# Patient Record
Sex: FEMALE | Race: WHITE | NOT HISPANIC OR LATINO | Employment: FULL TIME | URBAN - METROPOLITAN AREA
[De-identification: names, ages, dates, MRNs, and addresses within clinical notes are randomized per-mention and may not be internally consistent; named-entity substitution may affect disease eponyms.]

---

## 2023-11-17 ENCOUNTER — OFFICE VISIT (OUTPATIENT)
Dept: ENDOCRINOLOGY | Facility: CLINIC | Age: 33
End: 2023-11-17
Payer: COMMERCIAL

## 2023-11-17 VITALS
OXYGEN SATURATION: 97 % | HEART RATE: 93 BPM | DIASTOLIC BLOOD PRESSURE: 80 MMHG | BODY MASS INDEX: 41.21 KG/M2 | SYSTOLIC BLOOD PRESSURE: 120 MMHG | HEIGHT: 63 IN | WEIGHT: 232.6 LBS

## 2023-11-17 DIAGNOSIS — E05.90 HYPERTHYROIDISM: Primary | ICD-10-CM

## 2023-11-17 DIAGNOSIS — E04.2 MULTIPLE THYROID NODULES: ICD-10-CM

## 2023-11-17 LAB
T3FREE SERPL-MCNC: 4.87 PG/ML (ref 2–4.4)
T4 FREE SERPL-MCNC: 1.19 NG/DL (ref 0.93–1.7)
TSH SERPL DL<=0.05 MIU/L-ACNC: 0.01 UIU/ML (ref 0.27–4.2)

## 2023-11-17 PROCEDURE — 84445 ASSAY OF TSI GLOBULIN: CPT | Performed by: INTERNAL MEDICINE

## 2023-11-17 PROCEDURE — 84439 ASSAY OF FREE THYROXINE: CPT | Performed by: INTERNAL MEDICINE

## 2023-11-17 PROCEDURE — 86376 MICROSOMAL ANTIBODY EACH: CPT | Performed by: INTERNAL MEDICINE

## 2023-11-17 PROCEDURE — 84443 ASSAY THYROID STIM HORMONE: CPT | Performed by: INTERNAL MEDICINE

## 2023-11-17 PROCEDURE — 84481 FREE ASSAY (FT-3): CPT | Performed by: INTERNAL MEDICINE

## 2023-11-17 PROCEDURE — 83520 IMMUNOASSAY QUANT NOS NONAB: CPT | Performed by: INTERNAL MEDICINE

## 2023-11-17 RX ORDER — RIMEGEPANT SULFATE 75 MG/75MG
75 TABLET, ORALLY DISINTEGRATING ORAL AS NEEDED
COMMUNITY

## 2023-11-17 RX ORDER — PROPRANOLOL HCL 60 MG
60 CAPSULE, EXTENDED RELEASE 24HR ORAL DAILY
COMMUNITY

## 2023-11-17 RX ORDER — TIZANIDINE 4 MG/1
4 TABLET ORAL AS NEEDED
COMMUNITY

## 2023-11-17 RX ORDER — LEVOCETIRIZINE DIHYDROCHLORIDE 5 MG/1
5 TABLET, FILM COATED ORAL EVERY EVENING
COMMUNITY

## 2023-11-17 RX ORDER — TIMOLOL MALEATE 5 MG/ML
1 SOLUTION/ DROPS OPHTHALMIC DAILY
COMMUNITY
Start: 2023-10-12

## 2023-11-17 RX ORDER — ONDANSETRON 4 MG/1
4 TABLET, ORALLY DISINTEGRATING ORAL AS NEEDED
COMMUNITY

## 2023-11-17 RX ORDER — GALCANEZUMAB 120 MG/ML
120 INJECTION, SOLUTION SUBCUTANEOUS
COMMUNITY

## 2023-11-17 RX ORDER — MONTELUKAST SODIUM 10 MG/1
10 TABLET ORAL NIGHTLY
COMMUNITY

## 2023-11-17 NOTE — PROGRESS NOTES
Chief Complaint   Patient presents with    Hyperthyroidism        New patient who is being seen in consultation regarding hyperthyroidism at the request of No ref. provider found     HPI   Fatuma Hurtado is a 33 y.o. female who presents for evaluation of hyperthyroidism.    Patient reports she was initially noted to have an abnormal TSH several years ago.  She is unsure if this was high or low but reports this resolved without intervention and has been normal on annual testing for several years.  She had an abnormal TSH again noted approximately 1 year ago which prompted thyroid ultrasound to be obtained.  Ultrasound noted thyroid nodules which were both biopsied and benign at [facility].  She had repeat ultrasound this year in follow-up.  Patient reports that she presented to her PCP due to issues with ongoing fevers and thyroid function was again checked prompting referral.  She reports that she has been treated for sinus infection as well as manage symptomatically for viral illness over the past few weeks.  She reports this is improved but not resolved.  She is scheduled to have a CT of her sinuses next week to look for ongoing infection.  She denies significant weight changes, changes in bowel habits.  She does endorse fatigue.      Past Medical History:   Diagnosis Date    Hearing loss     Hearing aids in both ears    Migraine      Past Surgical History:   Procedure Laterality Date    CYST REMOVAL Left 2004    Was removed twice    EAR TUBES  2005    EAR TUBES  2001    SINUPLASTY  2018    TONSILLECTOMY  2005    WISDOM TOOTH EXTRACTION  2009      Family History   Problem Relation Age of Onset    Crohn's disease Mother     Hypertension Father     Other Paternal Aunt         Thyroid problem    Stomach cancer Maternal Grandmother       Social History     Socioeconomic History    Marital status: Single   Tobacco Use    Smoking status: Never    Smokeless tobacco: Never   Vaping Use    Vaping Use: Never  "used   Substance and Sexual Activity    Alcohol use: Never    Drug use: Never      Allergies   Allergen Reactions    Azithromycin Rash and Unknown - Low Severity      Current Outpatient Medications on File Prior to Visit   Medication Sig Dispense Refill    Emgality 120 MG/ML auto-injector pen Inject 1 mL under the skin into the appropriate area as directed Every 30 (Thirty) Days.      levocetirizine (XYZAL) 5 MG tablet Take 1 tablet by mouth Every Evening.      montelukast (SINGULAIR) 10 MG tablet Take 1 tablet by mouth Every Night.      ondansetron ODT (ZOFRAN-ODT) 4 MG disintegrating tablet Place 1 tablet on the tongue As Needed.      propranolol LA (INDERAL LA) 60 MG 24 hr capsule Take 1 capsule by mouth Daily.      Rimegepant Sulfate (Nurtec) 75 MG tablet dispersible tablet Take 1 tablet by mouth As Needed.      tiZANidine (ZANAFLEX) 4 MG tablet Take 1 tablet by mouth As Needed.      Vienva 0.1-20 MG-MCG per tablet Take 1 tablet by mouth Daily.       No current facility-administered medications on file prior to visit.        Review of Systems   Constitutional:  Positive for fatigue and fever. Negative for unexpected weight gain and unexpected weight loss.   Cardiovascular:  Negative for palpitations.   Gastrointestinal:  Negative for constipation and diarrhea.   Endocrine: Negative for cold intolerance and heat intolerance.   Neurological:  Negative for tremors.        Vitals:    11/17/23 1420   BP: 120/80   BP Location: Left arm   Patient Position: Sitting   Cuff Size: Adult   Pulse: 93   SpO2: 97%   Weight: 106 kg (232 lb 9.6 oz)   Height: 160 cm (63\")   Body mass index is 41.2 kg/m².     Physical Exam  Vitals reviewed.   Eyes:      General: Lids are normal.   Neck:      Thyroid: Thyromegaly present.   Cardiovascular:      Rate and Rhythm: Normal rate and regular rhythm.   Pulmonary:      Effort: Pulmonary effort is normal.      Breath sounds: Normal breath sounds.   Skin:     General: Skin is warm and dry. "   Neurological:      Mental Status: She is alert.   Psychiatric:         Mood and Affect: Mood and affect normal.        Labs/Imaging  Labs dated 10/30/2023  TSH less than 0.015  Free T4 1.22     Labs dated 10/13/2023  TSH less than 0.015  CMP with alkaline phosphatase 55, AST 20, ALT 29  CBC with ANC 4.05    Thyroid ultrasound dated 11/10/2023  Right thyroid lobe measures 4.0 x 1.3 x 1.6 cm  Left thyroid lobe measures 4 x 2.2 x 2.6 cm  Nodule in left upper thyroid lobe measuring 2.8 x 1.8 x 2.2 cm, this is mixed cystic and solid, increased in size but similar in appearance to prior imaging previously measuring 2.6 x 1.4 x 1.9 cm    Left mid posterior nodule measuring 2.1 x 1.0 x 1.8 cm, similar in size and appearance to prior imaging, TR 3    Assessment and Plan    Diagnoses and all orders for this visit:    1. Hyperthyroidism (Primary)  -     TSH; Future  -     T4, Free; Future  -     T3, Free; Future  -     Thyrotropin Receptor Antibody; Future  -     Thyroid Stimulating Immunoglobulin; Future  -     Thyroid Peroxidase Antibody; Future  Patient with history of abnormal TSH intermittently dating back multiple years.  She has never taken medication to alter thyroid hormone.  Most recent labs with suppressed TSH, normal free T4.  No marker of T3 available.  She does report that she has a few family members with thyroid dysfunction but does not know the exact abnormality.    Potential etiology of the patient's laboratory abnormalities were reviewed.  Given duration, this is less likely to represent thyroiditis though patient did present with acute illness when labs were checked.  Reviewed possibility for overproduction of thyroid hormone secondary to thyroid nodules as well as autoimmune disease.  Plan to update thyroid function testing today and obtain thyroid antibodies.  If patient remains hypothyroid and thyroid antibodies are normal, would consider thyroid uptake and scan.  Reviewed with patient that I would be  hesitant to attribute her ongoing symptoms of fatigue and fevers to thyroid dysfunction given subclinical disease.  I would recommend ongoing evaluation with her PCP for other potential etiologies.  We did discuss potential treatment with methimazole if hyperthyroidism is ongoing.   Risks associated with thyroid hormone dysregulation as well as the risks associated with methimazole use during pregnancy were discussed with the patient.  Patient was counseled to use reliable contraception.  If she were to become pregnant, patient instructed to contact the clinic for further instructions.     2. Multiple thyroid nodules  Patient reports that thyroid nodules were biopsied and benign in 2022.  Requesting records.       Return in about 4 months (around 3/17/2024) for Next scheduled follow up. The patient was instructed to contact the clinic with any interval questions or concerns.    Paige Berkowitz MD     Dictated Utilizing Dragon Dictation

## 2023-11-19 LAB — THYROPEROXIDASE AB SERPL-ACNC: 64 IU/ML (ref 0–34)

## 2023-11-20 ENCOUNTER — TELEPHONE (OUTPATIENT)
Dept: ENDOCRINOLOGY | Facility: CLINIC | Age: 33
End: 2023-11-20
Payer: COMMERCIAL

## 2023-11-20 NOTE — TELEPHONE ENCOUNTER
----- Message from Paige Berkowitz MD sent at 11/17/2023  3:45 PM EST -----  Please request results of prior thyroid FNA from [facility] completed approximately 1 year ago.

## 2023-11-21 LAB
TSH RECEP AB SER-ACNC: <1.1 IU/L (ref 0–1.75)
TSI SER-ACNC: <0.1 IU/L (ref 0–0.55)

## 2024-01-15 ENCOUNTER — HOSPITAL ENCOUNTER (OUTPATIENT)
Dept: NUCLEAR MEDICINE | Facility: HOSPITAL | Age: 34
Discharge: HOME | End: 2024-01-15
Payer: COMMERCIAL

## 2024-01-15 DIAGNOSIS — E05.90 HYPERTHYROIDISM: ICD-10-CM

## 2024-01-15 DIAGNOSIS — E04.2 MULTIPLE THYROID NODULES: ICD-10-CM

## 2024-01-15 PROCEDURE — 78014 THYROID IMAGING W/BLOOD FLOW: CPT

## 2024-01-15 PROCEDURE — A9516 IODINE I-123 SOD IODIDE MIC: HCPCS | Performed by: INTERNAL MEDICINE

## 2024-01-15 PROCEDURE — 0 SODIUM IODIDE 3.7 MBQ CAPSULE: Performed by: INTERNAL MEDICINE

## 2024-01-15 RX ORDER — SODIUM IODIDE I 123 100 UCI/1
1 CAPSULE, GELATIN COATED ORAL
Status: COMPLETED | OUTPATIENT
Start: 2024-01-15 | End: 2024-01-15

## 2024-01-15 RX ADMIN — SODIUM IODIDE I 123 1 CAPSULE: 100 CAPSULE, GELATIN COATED ORAL at 10:59

## 2024-01-16 ENCOUNTER — HOSPITAL ENCOUNTER (OUTPATIENT)
Dept: NUCLEAR MEDICINE | Facility: HOSPITAL | Age: 34
Discharge: HOME | End: 2024-01-16
Payer: COMMERCIAL

## 2024-01-23 ENCOUNTER — TELEPHONE (OUTPATIENT)
Dept: ENDOCRINOLOGY | Facility: CLINIC | Age: 34
End: 2024-01-23
Payer: COMMERCIAL

## 2024-01-23 NOTE — TELEPHONE ENCOUNTER
Please contact patient.  I can see that her thyroid uptake and scan resulted while I was out of the office.  It appears that Dr. Rosenstein did discuss this with her but she wanted to discuss further with me prior to making a treatment plan.  She is not currently scheduled to return to the office until March.  If desired by patient, please aid in scheduling a sooner appointment.  I do have several cancellations this week if she is available.  In the interim, I would recommend continued use of reliable contraception as thyroid hormone dysregulation can be potentially dangerous in pregnancy.

## 2024-01-25 ENCOUNTER — OFFICE VISIT (OUTPATIENT)
Dept: ENDOCRINOLOGY | Facility: CLINIC | Age: 34
End: 2024-01-25
Payer: COMMERCIAL

## 2024-01-25 VITALS
HEART RATE: 84 BPM | OXYGEN SATURATION: 98 % | DIASTOLIC BLOOD PRESSURE: 76 MMHG | WEIGHT: 235 LBS | HEIGHT: 63 IN | BODY MASS INDEX: 41.64 KG/M2 | SYSTOLIC BLOOD PRESSURE: 122 MMHG

## 2024-01-25 DIAGNOSIS — E05.90 HYPERTHYROIDISM: Primary | ICD-10-CM

## 2024-01-25 DIAGNOSIS — E04.2 MULTIPLE THYROID NODULES: ICD-10-CM

## 2024-01-25 PROCEDURE — 84439 ASSAY OF FREE THYROXINE: CPT | Performed by: INTERNAL MEDICINE

## 2024-01-25 PROCEDURE — 84481 FREE ASSAY (FT-3): CPT | Performed by: INTERNAL MEDICINE

## 2024-01-25 PROCEDURE — 99214 OFFICE O/P EST MOD 30 MIN: CPT | Performed by: INTERNAL MEDICINE

## 2024-01-25 PROCEDURE — 84443 ASSAY THYROID STIM HORMONE: CPT | Performed by: INTERNAL MEDICINE

## 2024-01-25 RX ORDER — METHIMAZOLE 5 MG/1
5 TABLET ORAL DAILY
Qty: 30 TABLET | Refills: 5 | Status: SHIPPED | OUTPATIENT
Start: 2024-01-25 | End: 2025-01-24

## 2024-01-25 NOTE — PROGRESS NOTES
"Chief Complaint   Patient presents with    Hyperthyroidism        HPI   Fatuma Hurtado is a 33 y.o. female had concerns including Hyperthyroidism.      Patient presents to discuss results of recent thyroid uptake and scan.  She is not currently on any medication for hyperthyroidism.  She does report ongoing fatigue, intermittent fevers.  She has undergone extensive evaluation with infectious disease regarding her intermittent fevers have not found an etiology.  They are concerned this may be related to her thyroid.    The following portions of the patient's history were reviewed and updated as appropriate: allergies, current medications, and past social history.    Review of Systems   Constitutional:  Positive for fatigue and fever.   Cardiovascular:  Negative for palpitations.   Gastrointestinal:  Negative for diarrhea.     /76 (BP Location: Right arm, Patient Position: Sitting, Cuff Size: Adult)   Pulse 84   Ht 160 cm (63\")   Wt 107 kg (235 lb)   SpO2 98%   BMI 41.63 kg/m²      Physical Exam      Constitutional:  well developed; well nourished  no acute distress   ENT/Thyroid: enlarged   Eyes: Conjunctiva: clear   Respiratory:  breathing is unlabored  clear to auscultation bilaterally   Cardiovascular:  regular rate and rhythm   Chest:  Not performed.   Abdomen: Not performed.   : Not performed.   Musculoskeletal: Not performed   Skin: not performed.   Neuro: mental status, speech normal   Psych: mood and affect are within normal limits       Labs/Imaging   Latest Reference Range & Units 11/17/23 15:03   TSH Baseline 0.270 - 4.200 uIU/mL 0.009 (L)   Free T4 0.93 - 1.70 ng/dL 1.19   T3, Free 2.00 - 4.40 pg/mL 4.87 (H)   Thyroid Peroxidase Antibody 0 - 34 IU/mL 64 (H)   Thyroid Stimulating Immunoglobulin 0.00 - 0.55 IU/L <0.10   Thyrotropin Receptor Antibody 0.00 - 1.75 IU/L <1.10   (L): Data is abnormally low  (H): Data is abnormally high    CMP dated 1/2/24  Alk phos 44  AST 18  ALT 13  eGFR " 124  CBC with ANC 5.7    DATE OF EXAM: 1/15/2024 10:57 AM EST     PROCEDURE: NM THYROID UPTAKE AND SCAN     INDICATIONS: Thyroid nodule, low TSH     COMPARISON: No comparisons available.     TECHNIQUE: The patient ingested a capsule containing 306 uCi of Iodine-123 and multiprojectional scintigraphic images of the thyroid gland were obtained using pinhole collimation.  Thyroid uptake was calculated from count method. In addition, the   patient's 24-hour radioiodine uptake level was calculated.     FINDINGS:   The nuclear medicine thyroid scan is abnormal. There appears to be absent uptake within one of the lobes of the thyroid gland. There is no thyroid ultrasound available at this facility for comparison. The 24-hour radioiodine uptake level measures 35.5%.   This is considered mildly increased (range of normal 10-35%).     IMPRESSION:     1. The thyroid scan is abnormal. There is uptake only seen within 1 lobe of the thyroid gland. There is no thyroid ultrasound available at this facility for comparison. I would recommend correlation with a thyroid ultrasound to assess for it autonomous   nodule.  2. Mildly increased 24-hour radioiodine uptake level of 35.5% (range of normal 10-35%).           Electronically Signed: Tian Hurt MD    1/16/2024 4:58 PM EST    Workstation ID: [id number]    Thyroid ultrasound dated 11/10/2023  Right thyroid lobe measures 4.0 x 1.3 x 1.6 cm  Left thyroid lobe measures 4 x 2.2 x 2.6 cm  Nodule in left upper thyroid lobe measuring 2.8 x 1.8 x 2.2 cm, this is mixed cystic and solid, increased in size but similar in appearance to prior imaging previously measuring 2.6 x 1.4 x 1.9 cm     Left mid posterior nodule measuring 2.1 x 1.0 x 1.8 cm, similar in size and appearance to prior imaging, TR 3       Diagnoses and all orders for this visit:    1. Hyperthyroidism (Primary)  -     TSH; Future  -     T4, Free; Future  -     T3, Free; Future  2. Multiple thyroid nodules  Recent thyroid uptake  and scan was reviewed with the patient.  Scan revealed unilateral uptake.  Patient does have known thyroid nodules on left.  Report does not note which side had increased uptake on scan.  Patient reports symptoms of fatigue and fever.  We did review that these are nonspecific symptoms which are frequently multifactorial.  Furthermore, fevers would be atypical given mild hyperthyroidism.  Now that uptake and scan is complete, patient is interested in initiation of medication to normalize thyroid function.  Will plan to start methimazole 5 mg daily.  Updating labs today, adjust dose as needed.  CBC and CMP reviewed from January 2024.  We again reviewed long-term options for management of hyperthyroidism including use of methimazole versus radioactive iodine ablation versus thyroid surgery.  We reviewed that with radioactive iodine ablation, the expectation would be that she would become hypothyroid and that thyroid nodules would require ongoing monitoring.  Patient will continue to think about this but is leaning toward surgery.  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  Discussed goal of normalization of thyroid hormone prior to surgery.  Patient voiced understanding.    Other orders  -     methIMAzole (TAPAZOLE) 5 MG tablet; Take 1 tablet by mouth Daily.  Dispense: 30 tablet; Refill: 5    Return in about 2 months (around 3/25/2024) for Next scheduled follow up. The patient was instructed to contact the clinic with any interval questions or concerns.    Electronically signed by: Paige Berkowitz MD   Endocrinologist    Dictated Utilizing Dragon Dictation

## 2024-01-26 LAB
T3FREE SERPL-MCNC: 4.27 PG/ML (ref 2–4.4)
T4 FREE SERPL-MCNC: 1.15 NG/DL (ref 0.93–1.7)
TSH SERPL DL<=0.05 MIU/L-ACNC: 0.01 UIU/ML (ref 0.27–4.2)

## 2024-02-23 ENCOUNTER — TELEPHONE (OUTPATIENT)
Dept: ENDOCRINOLOGY | Facility: CLINIC | Age: 34
End: 2024-02-23
Payer: COMMERCIAL

## 2024-02-23 RX ORDER — PROPYLTHIOURACIL 50 MG/1
50 TABLET ORAL 3 TIMES DAILY
Qty: 90 TABLET | Refills: 2 | Status: SHIPPED | OUTPATIENT
Start: 2024-02-23 | End: 2025-02-22

## 2024-02-23 NOTE — TELEPHONE ENCOUNTER
PLEASE CALL PT BACK SHE IS STILL WAITING TO HEAR BACK FROM HER PREVIOUS MESSAGE ABOUT THE 50 MG 3 TIMES A DAY MEDICATION SHE COULD NOT REMEMBER THE NAME OF IT    PT  NUMBER  [phone]

## 2024-03-26 ENCOUNTER — OFFICE VISIT (OUTPATIENT)
Dept: ENDOCRINOLOGY | Facility: CLINIC | Age: 34
End: 2024-03-26
Payer: COMMERCIAL

## 2024-03-26 VITALS
OXYGEN SATURATION: 98 % | WEIGHT: 237 LBS | BODY MASS INDEX: 41.99 KG/M2 | SYSTOLIC BLOOD PRESSURE: 124 MMHG | HEIGHT: 63 IN | DIASTOLIC BLOOD PRESSURE: 70 MMHG | HEART RATE: 88 BPM

## 2024-03-26 DIAGNOSIS — E04.2 MULTIPLE THYROID NODULES: ICD-10-CM

## 2024-03-26 DIAGNOSIS — E05.90 HYPERTHYROIDISM: Primary | ICD-10-CM

## 2024-03-26 LAB
T3FREE SERPL-MCNC: 4.02 PG/ML (ref 2–4.4)
T4 FREE SERPL-MCNC: 1.34 NG/DL (ref 0.93–1.7)
TSH SERPL DL<=0.05 MIU/L-ACNC: 0.01 UIU/ML (ref 0.27–4.2)

## 2024-03-26 PROCEDURE — 84481 FREE ASSAY (FT-3): CPT | Performed by: INTERNAL MEDICINE

## 2024-03-26 PROCEDURE — 84439 ASSAY OF FREE THYROXINE: CPT | Performed by: INTERNAL MEDICINE

## 2024-03-26 PROCEDURE — 99214 OFFICE O/P EST MOD 30 MIN: CPT | Performed by: INTERNAL MEDICINE

## 2024-03-26 PROCEDURE — 84443 ASSAY THYROID STIM HORMONE: CPT | Performed by: INTERNAL MEDICINE

## 2024-03-26 NOTE — PROGRESS NOTES
"Chief Complaint   Patient presents with    Hyperthyroidism        HPI   Fatuma Hurtado is a 34 y.o. female had concerns including Hyperthyroidism.      Patient resents for follow-up of hyperthyroidism.  She was last seen in January 2024.  Methimazole was discontinued due to concern for rash on the neck and chest as well as pruritus.  She also developed rash with initiation of propylthiouracil prompting discontinuation in early March.  She reports that rash has now resolved.  Patient reports that she has done some interval research regarding radioactive iodine ablation and is leaning towards surgical for definitive management.  She reports that she would like to have surgery at .    The following portions of the patient's history were reviewed and updated as appropriate: allergies, current medications, and past social history.    Review of Systems   Constitutional:  Negative for unexpected weight loss.   Cardiovascular:  Negative for palpitations.   Skin:  Negative for rash.      /70 (BP Location: Left arm, Patient Position: Sitting, Cuff Size: Adult)   Pulse 88   Ht 160 cm (63\")   Wt 108 kg (237 lb)   SpO2 98%   BMI 41.98 kg/m²      Physical Exam      Constitutional:  well developed; well nourished  no acute distress  obese - Body mass index is 41.98 kg/m².   ENT/Thyroid: enlarged   Eyes: Conjunctiva: clear   Respiratory:  breathing is unlabored  clear to auscultation bilaterally   Cardiovascular:  regular rate and rhythm   Chest:  Not performed.   Abdomen: Not performed.   : Not performed.   Musculoskeletal: Not performed   Skin: not performed.   Neuro: mental status, speech normal   Psych: mood and affect are within normal limits       Labs/Imaging  DATE OF EXAM: 1/15/2024 10:57 AM EST     PROCEDURE: NM THYROID UPTAKE AND SCAN     INDICATIONS: Thyroid nodule, low TSH     COMPARISON: No comparisons available.     TECHNIQUE: The patient ingested a capsule containing 306 uCi of Iodine-123 and " multiprojectional scintigraphic images of the thyroid gland were obtained using pinhole collimation.  Thyroid uptake was calculated from count method. In addition, the   patient's 24-hour radioiodine uptake level was calculated.     FINDINGS:   The nuclear medicine thyroid scan is abnormal. There appears to be absent uptake within one of the lobes of the thyroid gland. There is no thyroid ultrasound available at this facility for comparison. The 24-hour radioiodine uptake level measures 35.5%.   This is considered mildly increased (range of normal 10-35%).     IMPRESSION:     1. The thyroid scan is abnormal. There is uptake only seen within 1 lobe of the thyroid gland. There is no thyroid ultrasound available at this facility for comparison. I would recommend correlation with a thyroid ultrasound to assess for it autonomous   nodule.  2. Mildly increased 24-hour radioiodine uptake level of 35.5% (range of normal 10-35%).           Electronically Signed: Tian Hurt MD    1/16/2024 4:58 PM EST    Workstation ID: [id number]     Latest Reference Range & Units 11/17/23 15:03 01/25/24 16:19   TSH Baseline 0.270 - 4.200 uIU/mL 0.009 (L) 0.009 (L)   Free T4 0.93 - 1.70 ng/dL 1.19 1.15   T3, Free 2.00 - 4.40 pg/mL 4.87 (H) 4.27   Thyroid Peroxidase Antibody 0 - 34 IU/mL 64 (H)    Thyroid Stimulating Immunoglobulin 0.00 - 0.55 IU/L <0.10    Thyrotropin Receptor Antibody 0.00 - 1.75 IU/L <1.10    (L): Data is abnormally low  (H): Data is abnormally high    Diagnoses and all orders for this visit:    1. Hyperthyroidism (Primary)  -     TSH; Future  -     T4, Free; Future  -     T3, Free; Future  2.  Multiple thyroid nodules  Most recent labs revealed ongoing subclinical hyperthyroidism.  Thyroid antibodies were previously negative.  Thyroid uptake and scan with unilateral uptake.  Report from uptake and scan does not specify lateralization though in the setting of known thyroid nodules on left we reviewed this likely represents  hyperfunctioning nodule on the left side.  Patient request we reach out to radiology to see if lateralization can be verified.  If so, this would allow for hemithyroidectomy.  Patient reports she would favor total thyroidectomy laterality if hyperfunctioning lobe cannot be verified per imaging.  Patient does report she favors surgery as definitive management.  She would like to have this done .  Patient previously did not tolerate medical therapy with methimazole or propylthiouracil, developing rash with both.  We did discuss slightly increased risk of surgery if thyroid hormone not normalized entirely prior to surgery.  We did discuss continuation of propranolol which patient has taken historically for migraines.  She is currently taking 60 mg extended release daily.  Plan to update thyroid function testing today.  Message sent to radiology per patient request.  Plan to proceed with referral to endocrine surgery at  once all data is available.  Symptoms of thyroid hormone abnormalities were reviewed and patient will contact the clinic in the interim between visits with any concerning changes.    Addendum: Thyroid uptake and scan result reviewed with radiology who stated is not possible to determine laterality of uptake from imaging.  Per discussion during visit, patient referred to endocrine surgery at  to discuss thyroidectomy.  Thyroid nodules were previously biopsied and benign.       Return in about 4 months (around 7/26/2024) for Next scheduled follow up. The patient was instructed to contact the clinic with any interval questions or concerns.    Electronically signed by: Paige Berkowitz MD   Endocrinologist    Dictated Utilizing Dragon Dictation

## 2024-03-28 DIAGNOSIS — E05.90 HYPERTHYROIDISM: Primary | ICD-10-CM

## 2024-03-28 DIAGNOSIS — E04.2 MULTIPLE THYROID NODULES: ICD-10-CM

## 2024-04-19 DIAGNOSIS — E05.90 HYPERTHYROIDISM: Primary | ICD-10-CM

## 2024-04-19 DIAGNOSIS — E04.2 MULTIPLE THYROID NODULES: ICD-10-CM

## 2024-06-13 ENCOUNTER — PRE-ADMISSION TESTING (OUTPATIENT)
Dept: PREADMISSION TESTING | Facility: HOSPITAL | Age: 34
End: 2024-06-13
Payer: COMMERCIAL

## 2024-06-13 VITALS — BODY MASS INDEX: 42.34 KG/M2 | HEIGHT: 63 IN | WEIGHT: 238.98 LBS

## 2024-06-13 LAB
DEPRECATED RDW RBC AUTO: 44.7 FL (ref 37–54)
ERYTHROCYTE [DISTWIDTH] IN BLOOD BY AUTOMATED COUNT: 14 % (ref 12.3–15.4)
HCT VFR BLD AUTO: 41.4 % (ref 34–46.6)
HGB BLD-MCNC: 13.2 G/DL (ref 12–15.9)
MCH RBC QN AUTO: 27.8 PG (ref 26.6–33)
MCHC RBC AUTO-ENTMCNC: 31.9 G/DL (ref 31.5–35.7)
MCV RBC AUTO: 87.2 FL (ref 79–97)
PLATELET # BLD AUTO: 356 10*3/MM3 (ref 140–450)
PMV BLD AUTO: 9.1 FL (ref 6–12)
POTASSIUM SERPL-SCNC: 4.2 MMOL/L (ref 3.5–5.2)
RBC # BLD AUTO: 4.75 10*6/MM3 (ref 3.77–5.28)
WBC NRBC COR # BLD AUTO: 9.4 10*3/MM3 (ref 3.4–10.8)

## 2024-06-13 PROCEDURE — 84132 ASSAY OF SERUM POTASSIUM: CPT

## 2024-06-13 PROCEDURE — 85027 COMPLETE CBC AUTOMATED: CPT

## 2024-06-13 PROCEDURE — 36415 COLL VENOUS BLD VENIPUNCTURE: CPT

## 2024-06-13 RX ORDER — CHLORAL HYDRATE 500 MG
1000 CAPSULE ORAL
COMMUNITY

## 2024-06-19 ENCOUNTER — ANESTHESIA EVENT (OUTPATIENT)
Dept: PERIOP | Facility: HOSPITAL | Age: 34
End: 2024-06-19
Payer: COMMERCIAL

## 2024-06-19 RX ORDER — SODIUM CHLORIDE 9 MG/ML
40 INJECTION, SOLUTION INTRAVENOUS AS NEEDED
Status: CANCELLED | OUTPATIENT
Start: 2024-06-19

## 2024-06-19 RX ORDER — SODIUM CHLORIDE, SODIUM LACTATE, POTASSIUM CHLORIDE, CALCIUM CHLORIDE 600; 310; 30; 20 MG/100ML; MG/100ML; MG/100ML; MG/100ML
9 INJECTION, SOLUTION INTRAVENOUS CONTINUOUS
Status: CANCELLED | OUTPATIENT
Start: 2024-06-19

## 2024-06-19 RX ORDER — FAMOTIDINE 10 MG/ML
20 INJECTION, SOLUTION INTRAVENOUS ONCE
Status: CANCELLED | OUTPATIENT
Start: 2024-06-19 | End: 2024-06-19

## 2024-06-19 RX ORDER — FAMOTIDINE 20 MG/1
20 TABLET, FILM COATED ORAL ONCE
Status: CANCELLED | OUTPATIENT
Start: 2024-06-19 | End: 2024-06-19

## 2024-06-19 RX ORDER — LIDOCAINE HYDROCHLORIDE 10 MG/ML
0.5 INJECTION, SOLUTION EPIDURAL; INFILTRATION; INTRACAUDAL; PERINEURAL ONCE AS NEEDED
Status: CANCELLED | OUTPATIENT
Start: 2024-06-19

## 2024-06-19 RX ORDER — SODIUM CHLORIDE 0.9 % (FLUSH) 0.9 %
10 SYRINGE (ML) INJECTION AS NEEDED
Status: CANCELLED | OUTPATIENT
Start: 2024-06-19

## 2024-06-19 RX ORDER — MIDAZOLAM HYDROCHLORIDE 1 MG/ML
1 INJECTION INTRAMUSCULAR; INTRAVENOUS
Status: CANCELLED | OUTPATIENT
Start: 2024-06-19

## 2024-06-20 ENCOUNTER — ANESTHESIA (OUTPATIENT)
Dept: PERIOP | Facility: HOSPITAL | Age: 34
End: 2024-06-20
Payer: COMMERCIAL

## 2024-06-20 ENCOUNTER — HOSPITAL ENCOUNTER (OUTPATIENT)
Facility: HOSPITAL | Age: 34
Discharge: HOME | End: 2024-06-20
Attending: SURGERY | Admitting: SURGERY
Payer: COMMERCIAL

## 2024-06-20 VITALS
TEMPERATURE: 97.8 F | RESPIRATION RATE: 18 BRPM | DIASTOLIC BLOOD PRESSURE: 70 MMHG | HEIGHT: 63 IN | OXYGEN SATURATION: 96 % | HEART RATE: 90 BPM | BODY MASS INDEX: 42.17 KG/M2 | SYSTOLIC BLOOD PRESSURE: 118 MMHG | WEIGHT: 238 LBS

## 2024-06-20 DIAGNOSIS — E05.20 TOXIC MULTINODULAR GOITER: ICD-10-CM

## 2024-06-20 PROBLEM — E05.10 TOXIC THYROID NODULE: Status: ACTIVE | Noted: 2024-06-20

## 2024-06-20 LAB
B-HCG UR QL: NEGATIVE
CALCIUM SPEC-SCNC: 9 MG/DL (ref 8.6–10.5)
EXPIRATION DATE: NORMAL
INTERNAL NEGATIVE CONTROL: NEGATIVE
INTERNAL POSITIVE CONTROL: POSITIVE
Lab: NORMAL
PTH-INTACT SERPL-MCNC: 12.2 PG/ML (ref 15–65)

## 2024-06-20 PROCEDURE — 88307 TISSUE EXAM BY PATHOLOGIST: CPT | Performed by: SURGERY

## 2024-06-20 PROCEDURE — 25010000002 FENTANYL CITRATE (PF) 100 MCG/2ML SOLUTION

## 2024-06-20 PROCEDURE — 25010000002 FENTANYL CITRATE (PF) 50 MCG/ML SOLUTION

## 2024-06-20 PROCEDURE — 25010000002 MIDAZOLAM PER 1 MG: Performed by: STUDENT IN AN ORGANIZED HEALTH CARE EDUCATION/TRAINING PROGRAM

## 2024-06-20 PROCEDURE — 25010000002 DEXAMETHASONE PER 1 MG

## 2024-06-20 PROCEDURE — 81025 URINE PREGNANCY TEST: CPT | Performed by: ANESTHESIOLOGY

## 2024-06-20 PROCEDURE — 25010000002 CEFAZOLIN PER 500 MG: Performed by: SURGERY

## 2024-06-20 PROCEDURE — 25010000002 ONDANSETRON PER 1 MG

## 2024-06-20 PROCEDURE — 25010000002 SUGAMMADEX 200 MG/2ML SOLUTION

## 2024-06-20 PROCEDURE — 25810000003 LACTATED RINGERS PER 1000 ML: Performed by: ANESTHESIOLOGY

## 2024-06-20 PROCEDURE — 82310 ASSAY OF CALCIUM: CPT | Performed by: SURGERY

## 2024-06-20 PROCEDURE — 25010000002 PROPOFOL 10 MG/ML EMULSION

## 2024-06-20 PROCEDURE — 83970 ASSAY OF PARATHORMONE: CPT | Performed by: SURGERY

## 2024-06-20 DEVICE — CLIP LIGAT VASC HORIZON TI SM YEL 6CT: Type: IMPLANTABLE DEVICE | Site: NECK | Status: FUNCTIONAL

## 2024-06-20 DEVICE — CLIP LIGAT VASC HORIZON TI MD BLU 6CT: Type: IMPLANTABLE DEVICE | Site: NECK | Status: FUNCTIONAL

## 2024-06-20 DEVICE — ABSORBABLE HEMOSTAT (OXIDIZED REGENERATED CELLULOSE)
Type: IMPLANTABLE DEVICE | Site: NECK | Status: FUNCTIONAL
Brand: SURGICEL

## 2024-06-20 RX ORDER — OXYCODONE HYDROCHLORIDE AND ACETAMINOPHEN 5; 325 MG/1; MG/1
1 TABLET ORAL EVERY 4 HOURS PRN
Status: DISCONTINUED | OUTPATIENT
Start: 2024-06-20 | End: 2024-06-20 | Stop reason: HOSPADM

## 2024-06-20 RX ORDER — SODIUM CHLORIDE, SODIUM LACTATE, POTASSIUM CHLORIDE, CALCIUM CHLORIDE 600; 310; 30; 20 MG/100ML; MG/100ML; MG/100ML; MG/100ML
9 INJECTION, SOLUTION INTRAVENOUS CONTINUOUS
Status: DISCONTINUED | OUTPATIENT
Start: 2024-06-20 | End: 2024-06-20

## 2024-06-20 RX ORDER — SODIUM CHLORIDE 0.9 % (FLUSH) 0.9 %
10 SYRINGE (ML) INJECTION EVERY 12 HOURS SCHEDULED
Status: DISCONTINUED | OUTPATIENT
Start: 2024-06-20 | End: 2024-06-20 | Stop reason: HOSPADM

## 2024-06-20 RX ORDER — LEVOTHYROXINE SODIUM 175 UG/1
175 TABLET ORAL
Qty: 30 TABLET | Refills: 2 | Status: SHIPPED | OUTPATIENT
Start: 2024-06-21 | End: 2024-09-19

## 2024-06-20 RX ORDER — FENTANYL CITRATE 50 UG/ML
INJECTION, SOLUTION INTRAMUSCULAR; INTRAVENOUS AS NEEDED
Status: DISCONTINUED | OUTPATIENT
Start: 2024-06-20 | End: 2024-06-20 | Stop reason: SURG

## 2024-06-20 RX ORDER — CALCITRIOL 0.25 UG/1
0.5 CAPSULE, LIQUID FILLED ORAL EVERY 12 HOURS SCHEDULED
Status: DISCONTINUED | OUTPATIENT
Start: 2024-06-20 | End: 2024-06-20 | Stop reason: HOSPADM

## 2024-06-20 RX ORDER — NALOXONE HCL 0.4 MG/ML
0.1 VIAL (ML) INJECTION
Status: DISCONTINUED | OUTPATIENT
Start: 2024-06-20 | End: 2024-06-20 | Stop reason: HOSPADM

## 2024-06-20 RX ORDER — CALCIUM CARBONATE 500 MG/1
2 TABLET, CHEWABLE ORAL 2 TIMES DAILY
Status: DISCONTINUED | OUTPATIENT
Start: 2024-06-20 | End: 2024-06-20 | Stop reason: HOSPADM

## 2024-06-20 RX ORDER — FAMOTIDINE 20 MG/1
20 TABLET, FILM COATED ORAL 2 TIMES DAILY
Status: DISCONTINUED | OUTPATIENT
Start: 2024-06-20 | End: 2024-06-20 | Stop reason: HOSPADM

## 2024-06-20 RX ORDER — OXYCODONE HYDROCHLORIDE AND ACETAMINOPHEN 5; 325 MG/1; MG/1
1 TABLET ORAL EVERY 4 HOURS PRN
Qty: 10 TABLET | Refills: 0 | Status: SHIPPED | OUTPATIENT
Start: 2024-06-20 | End: 2024-06-30

## 2024-06-20 RX ORDER — DEXMEDETOMIDINE HYDROCHLORIDE 100 UG/ML
INJECTION, SOLUTION INTRAVENOUS AS NEEDED
Status: DISCONTINUED | OUTPATIENT
Start: 2024-06-20 | End: 2024-06-20 | Stop reason: SURG

## 2024-06-20 RX ORDER — FENTANYL CITRATE 50 UG/ML
INJECTION, SOLUTION INTRAMUSCULAR; INTRAVENOUS
Status: COMPLETED
Start: 2024-06-20 | End: 2024-06-20

## 2024-06-20 RX ORDER — LEVOTHYROXINE SODIUM 175 UG/1
175 TABLET ORAL
Status: DISCONTINUED | OUTPATIENT
Start: 2024-06-21 | End: 2024-06-20 | Stop reason: HOSPADM

## 2024-06-20 RX ORDER — ONDANSETRON 4 MG/1
4 TABLET, ORALLY DISINTEGRATING ORAL EVERY 6 HOURS PRN
Status: DISCONTINUED | OUTPATIENT
Start: 2024-06-20 | End: 2024-06-20 | Stop reason: HOSPADM

## 2024-06-20 RX ORDER — ONDANSETRON 2 MG/ML
INJECTION INTRAMUSCULAR; INTRAVENOUS AS NEEDED
Status: DISCONTINUED | OUTPATIENT
Start: 2024-06-20 | End: 2024-06-20 | Stop reason: SURG

## 2024-06-20 RX ORDER — LIDOCAINE HYDROCHLORIDE 10 MG/ML
INJECTION, SOLUTION EPIDURAL; INFILTRATION; INTRACAUDAL; PERINEURAL AS NEEDED
Status: DISCONTINUED | OUTPATIENT
Start: 2024-06-20 | End: 2024-06-20 | Stop reason: SURG

## 2024-06-20 RX ORDER — CALCITRIOL 0.5 UG/1
0.5 CAPSULE, LIQUID FILLED ORAL EVERY 12 HOURS SCHEDULED
Qty: 60 CAPSULE | Refills: 2 | Status: SHIPPED | OUTPATIENT
Start: 2024-06-20 | End: 2024-09-18

## 2024-06-20 RX ORDER — FENTANYL CITRATE 50 UG/ML
50 INJECTION, SOLUTION INTRAMUSCULAR; INTRAVENOUS
Status: DISCONTINUED | OUTPATIENT
Start: 2024-06-20 | End: 2024-06-20 | Stop reason: HOSPADM

## 2024-06-20 RX ORDER — HYDROMORPHONE HYDROCHLORIDE 1 MG/ML
0.5 INJECTION, SOLUTION INTRAMUSCULAR; INTRAVENOUS; SUBCUTANEOUS
Status: DISCONTINUED | OUTPATIENT
Start: 2024-06-20 | End: 2024-06-20 | Stop reason: HOSPADM

## 2024-06-20 RX ORDER — CALCIUM CARBONATE 500 MG/1
2 TABLET, CHEWABLE ORAL 2 TIMES DAILY
Qty: 120 TABLET | Refills: 2 | Status: SHIPPED | OUTPATIENT
Start: 2024-06-20 | End: 2024-09-18

## 2024-06-20 RX ORDER — PSEUDOEPHEDRINE HCL 30 MG
100 TABLET ORAL 2 TIMES DAILY PRN
Qty: 30 CAPSULE | Refills: 0 | Status: SHIPPED | OUTPATIENT
Start: 2024-06-20

## 2024-06-20 RX ORDER — DEXAMETHASONE SODIUM PHOSPHATE 4 MG/ML
INJECTION, SOLUTION INTRA-ARTICULAR; INTRALESIONAL; INTRAMUSCULAR; INTRAVENOUS; SOFT TISSUE AS NEEDED
Status: DISCONTINUED | OUTPATIENT
Start: 2024-06-20 | End: 2024-06-20 | Stop reason: SURG

## 2024-06-20 RX ORDER — ROCURONIUM BROMIDE 10 MG/ML
INJECTION, SOLUTION INTRAVENOUS AS NEEDED
Status: DISCONTINUED | OUTPATIENT
Start: 2024-06-20 | End: 2024-06-20 | Stop reason: SURG

## 2024-06-20 RX ORDER — FAMOTIDINE 20 MG/1
20 TABLET, FILM COATED ORAL ONCE
Status: COMPLETED | OUTPATIENT
Start: 2024-06-20 | End: 2024-06-20

## 2024-06-20 RX ORDER — BUPIVACAINE HYDROCHLORIDE AND EPINEPHRINE 2.5; 5 MG/ML; UG/ML
INJECTION, SOLUTION INFILTRATION; PERINEURAL AS NEEDED
Status: DISCONTINUED | OUTPATIENT
Start: 2024-06-20 | End: 2024-06-20 | Stop reason: HOSPADM

## 2024-06-20 RX ORDER — ACETAMINOPHEN 325 MG/1
650 TABLET ORAL EVERY 4 HOURS PRN
Status: DISCONTINUED | OUTPATIENT
Start: 2024-06-20 | End: 2024-06-20 | Stop reason: HOSPADM

## 2024-06-20 RX ORDER — DROPERIDOL 2.5 MG/ML
0.62 INJECTION, SOLUTION INTRAMUSCULAR; INTRAVENOUS ONCE AS NEEDED
Status: DISCONTINUED | OUTPATIENT
Start: 2024-06-20 | End: 2024-06-20 | Stop reason: HOSPADM

## 2024-06-20 RX ORDER — ONDANSETRON 2 MG/ML
4 INJECTION INTRAMUSCULAR; INTRAVENOUS EVERY 6 HOURS PRN
Status: DISCONTINUED | OUTPATIENT
Start: 2024-06-20 | End: 2024-06-20 | Stop reason: HOSPADM

## 2024-06-20 RX ORDER — MIDAZOLAM HYDROCHLORIDE 1 MG/ML
1 INJECTION INTRAMUSCULAR; INTRAVENOUS
Status: DISCONTINUED | OUTPATIENT
Start: 2024-06-20 | End: 2024-06-20 | Stop reason: HOSPADM

## 2024-06-20 RX ORDER — SODIUM CHLORIDE 9 MG/ML
40 INJECTION, SOLUTION INTRAVENOUS AS NEEDED
Status: DISCONTINUED | OUTPATIENT
Start: 2024-06-20 | End: 2024-06-20 | Stop reason: HOSPADM

## 2024-06-20 RX ORDER — DOCUSATE SODIUM 100 MG/1
100 CAPSULE, LIQUID FILLED ORAL 2 TIMES DAILY PRN
Status: DISCONTINUED | OUTPATIENT
Start: 2024-06-20 | End: 2024-06-20 | Stop reason: HOSPADM

## 2024-06-20 RX ORDER — MIDAZOLAM HYDROCHLORIDE 1 MG/ML
2 INJECTION INTRAMUSCULAR; INTRAVENOUS ONCE
Status: COMPLETED | OUTPATIENT
Start: 2024-06-20 | End: 2024-06-20

## 2024-06-20 RX ORDER — PROPOFOL 10 MG/ML
VIAL (ML) INTRAVENOUS AS NEEDED
Status: DISCONTINUED | OUTPATIENT
Start: 2024-06-20 | End: 2024-06-20 | Stop reason: SURG

## 2024-06-20 RX ORDER — LIDOCAINE HYDROCHLORIDE 10 MG/ML
0.5 INJECTION, SOLUTION EPIDURAL; INFILTRATION; INTRACAUDAL; PERINEURAL ONCE AS NEEDED
Status: COMPLETED | OUTPATIENT
Start: 2024-06-20 | End: 2024-06-20

## 2024-06-20 RX ADMIN — DEXMEDETOMIDINE HYDROCHLORIDE 4 MCG: 100 INJECTION, SOLUTION INTRAVENOUS at 08:06

## 2024-06-20 RX ADMIN — OXYCODONE HYDROCHLORIDE AND ACETAMINOPHEN 1 TABLET: 5; 325 TABLET ORAL at 15:57

## 2024-06-20 RX ADMIN — DEXMEDETOMIDINE HYDROCHLORIDE 4 MCG: 100 INJECTION, SOLUTION INTRAVENOUS at 07:51

## 2024-06-20 RX ADMIN — ACETAMINOPHEN 650 MG: 325 TABLET ORAL at 11:37

## 2024-06-20 RX ADMIN — FENTANYL CITRATE 50 MCG: 50 INJECTION, SOLUTION INTRAMUSCULAR; INTRAVENOUS at 07:32

## 2024-06-20 RX ADMIN — DEXMEDETOMIDINE HYDROCHLORIDE 4 MCG: 100 INJECTION, SOLUTION INTRAVENOUS at 07:32

## 2024-06-20 RX ADMIN — DEXMEDETOMIDINE HYDROCHLORIDE 4 MCG: 100 INJECTION, SOLUTION INTRAVENOUS at 07:53

## 2024-06-20 RX ADMIN — SODIUM CHLORIDE, POTASSIUM CHLORIDE, SODIUM LACTATE AND CALCIUM CHLORIDE 9 ML/HR: 600; 310; 30; 20 INJECTION, SOLUTION INTRAVENOUS at 06:42

## 2024-06-20 RX ADMIN — DEXMEDETOMIDINE HYDROCHLORIDE 4 MCG: 100 INJECTION, SOLUTION INTRAVENOUS at 08:03

## 2024-06-20 RX ADMIN — FENTANYL CITRATE 50 MCG: 50 INJECTION, SOLUTION INTRAMUSCULAR; INTRAVENOUS at 09:52

## 2024-06-20 RX ADMIN — LIDOCAINE HYDROCHLORIDE 0.5 ML: 10 INJECTION, SOLUTION EPIDURAL; INFILTRATION; INTRACAUDAL; PERINEURAL at 06:42

## 2024-06-20 RX ADMIN — DEXAMETHASONE SODIUM PHOSPHATE 8 MG: 4 INJECTION, SOLUTION INTRA-ARTICULAR; INTRALESIONAL; INTRAMUSCULAR; INTRAVENOUS; SOFT TISSUE at 07:37

## 2024-06-20 RX ADMIN — CALCITRIOL CAPSULES 0.25 MCG 0.5 MCG: 0.25 CAPSULE ORAL at 12:59

## 2024-06-20 RX ADMIN — LIDOCAINE HYDROCHLORIDE 50 MG: 10 INJECTION, SOLUTION EPIDURAL; INFILTRATION; INTRACAUDAL; PERINEURAL at 07:32

## 2024-06-20 RX ADMIN — ROCURONIUM BROMIDE 20 MG: 10 INJECTION INTRAVENOUS at 07:32

## 2024-06-20 RX ADMIN — DEXMEDETOMIDINE HYDROCHLORIDE 4 MCG: 100 INJECTION, SOLUTION INTRAVENOUS at 08:29

## 2024-06-20 RX ADMIN — DEXMEDETOMIDINE HYDROCHLORIDE 4 MCG: 100 INJECTION, SOLUTION INTRAVENOUS at 07:29

## 2024-06-20 RX ADMIN — DEXMEDETOMIDINE HYDROCHLORIDE 4 MCG: 100 INJECTION, SOLUTION INTRAVENOUS at 08:04

## 2024-06-20 RX ADMIN — SUGAMMADEX 200 MG: 100 INJECTION, SOLUTION INTRAVENOUS at 09:06

## 2024-06-20 RX ADMIN — MIDAZOLAM HYDROCHLORIDE 2 MG: 1 INJECTION, SOLUTION INTRAMUSCULAR; INTRAVENOUS at 06:57

## 2024-06-20 RX ADMIN — CALCIUM CARBONATE (ANTACID) CHEW TAB 500 MG 2 TABLET: 500 CHEW TAB at 11:37

## 2024-06-20 RX ADMIN — FENTANYL CITRATE 50 MCG: 50 INJECTION, SOLUTION INTRAMUSCULAR; INTRAVENOUS at 07:51

## 2024-06-20 RX ADMIN — PROPOFOL 250 MG: 10 INJECTION, EMULSION INTRAVENOUS at 07:32

## 2024-06-20 RX ADMIN — FAMOTIDINE 20 MG: 20 TABLET, FILM COATED ORAL at 11:37

## 2024-06-20 RX ADMIN — DEXMEDETOMIDINE HYDROCHLORIDE 4 MCG: 100 INJECTION, SOLUTION INTRAVENOUS at 07:52

## 2024-06-20 RX ADMIN — ONDANSETRON 4 MG: 2 INJECTION INTRAMUSCULAR; INTRAVENOUS at 07:30

## 2024-06-20 RX ADMIN — FAMOTIDINE 20 MG: 20 TABLET, FILM COATED ORAL at 06:56

## 2024-06-20 RX ADMIN — DEXMEDETOMIDINE HYDROCHLORIDE 4 MCG: 100 INJECTION, SOLUTION INTRAVENOUS at 08:12

## 2024-06-20 RX ADMIN — SODIUM CHLORIDE 2000 MG: 900 INJECTION INTRAVENOUS at 07:36

## 2024-06-20 NOTE — ANESTHESIA PROCEDURE NOTES
Airway  Urgency: elective    Date/Time: 6/20/2024 7:35 AM  Airway not difficult    General Information and Staff    Patient location during procedure: OR    Indications and Patient Condition  Indications for airway management: airway protection    Preoxygenated: yes  MILS not maintained throughout  Mask difficulty assessment: 2 - vent by mask + OA or adjuvant +/- NMBA    Final Airway Details  Final airway type: endotracheal airway      Successful airway: ETT and NIM tube  Cuffed: yes   Successful intubation technique: video laryngoscopy  Facilitating devices/methods: intubating stylet  Endotracheal tube insertion site: oral  Blade: [place]  Blade size: 3  ETT size (mm): 7.0  Cormack-Lehane Classification: grade IIa - partial view of glottis  Placement verified by: chest auscultation and capnometry   Cuff volume (mL): 6  Measured from: lips  ETT/EBT  to lips (cm): 20  Number of attempts at approach: 1  Assessment: lips, teeth, and gum same as pre-op and atraumatic intubation    Additional Comments  Negative epigastric sounds, Breath sound equal bilaterally with symmetric chest rise and fall

## 2024-06-20 NOTE — CASE MANAGEMENT/SOCIAL WORK
Continued Stay Note  [facility]     Patient Name: Fatuma Hurtado  MRN: [id number]  Today's Date: 6/20/2024    Admit Date: 6/20/2024        Discharge Plan       Row Name 06/20/24 1607       Plan    Final Discharge Disposition Code 01 - home or self-care                   Discharge Codes    No documentation.                 Expected Discharge Date and Time       Expected Discharge Date Expected Discharge Time    Jun 20, 2024               YASHIRA Lobato

## 2024-06-20 NOTE — H&P
"Pre-Op H&P  Fatuma Hurtado  [id number]  1990    Chief complaint: \"I have a thyroid nodule\"    HPI:    Patient is a 34 y.o.female who presents with hyperthyroidism and failing conservative therapy.  She has an known thyroid nodule that is becoming more and more tender.  Is enlarging in size.  She has no history of thyroid cancer.  After failing conservative therapy the patient is now admitted for total thyroidectomy.    Review of Systems:  General ROS: negative for chills, fever or skin lesions;  No changes since last office visit.  Neg for recent sick exposure  Cardiovascular ROS: no chest pain or dyspnea on exertion  Respiratory ROS: no cough, shortness of breath, or wheezing    Allergies:   Allergies   Allergen Reactions    Azithromycin Rash    Methimazole Itching and Rash    Propylthiouracil Itching and Rash       Home Meds:    No current facility-administered medications on file prior to encounter.     Current Outpatient Medications on File Prior to Encounter   Medication Sig Dispense Refill    Emgality 120 MG/ML auto-injector pen Inject 1 mL under the skin into the appropriate area as directed Every 30 (Thirty) Days.      levocetirizine (XYZAL) 5 MG tablet Take 1 tablet by mouth Every Evening.      montelukast (SINGULAIR) 10 MG tablet Take 1 tablet by mouth Every Night.      propranolol LA (INDERAL LA) 60 MG 24 hr capsule Take 1 capsule by mouth Daily.      Rimegepant Sulfate (Nurtec) 75 MG tablet dispersible tablet Take 1 tablet by mouth As Needed.      tiZANidine (ZANAFLEX) 4 MG tablet Take 1 tablet by mouth As Needed.      Vienva 0.1-20 MG-MCG per tablet Take 1 tablet by mouth Daily.      ondansetron ODT (ZOFRAN-ODT) 4 MG disintegrating tablet Place 1 tablet on the tongue As Needed.         PMH:   Past Medical History:   Diagnosis Date    Hearing loss     Hearing aids in both ears    Hyperthyroidism     Migraine     Tendonitis of elbow, right      PSH:    Past Surgical History:   Procedure Laterality " Date    CYST REMOVAL Left 2004    Was removed twice    EAR TUBES  2005    EAR TUBES  2001    SINUPLASTY  2018    TONSILLECTOMY  2005    WISDOM TOOTH EXTRACTION  2009     Patient denies allergy to contrast dye or latex  Immunization History:  Influenza: Yes  Pneumococcal: No  Tetanus: Up-to-date    Social History:   Tobacco:   Social History     Tobacco Use   Smoking Status Never   Smokeless Tobacco Never      Alcohol:     Social History     Substance and Sexual Activity   Alcohol Use Never       Vitals:           There were no vitals taken for this visit.    Physical Exam:  General Appearance:    Alert, cooperative, no distress, appears stated age   Head:    Normocephalic, without obvious abnormality, atraumatic   Lungs:   Clear to auscultation bilaterally to the bases    Heart: S1-S2 without rubs murmurs or gallops    Abdomen:  Soft, nontender, bowel sounds present throughout.   Breast Exam:    deferred   Genitalia:    deferred   Extremities:   Extremities normal, atraumatic, no cyanosis or edema   Skin:   Skin color, texture, turgor normal, no rashes or lesions   Neurologic:   Grossly intact   Results Review  LABS:  Lab Results   Component Value Date    WBC 9.40 06/13/2024    HGB 13.2 06/13/2024    HCT 41.4 06/13/2024    MCV 87.2 06/13/2024     06/13/2024    K 4.2 06/13/2024       RADIOLOGY:  No radiology results for the last 3 days     I reviewed the patient's new clinical results.    Cancer Staging (if applicable)  Cancer Patient: __ yes __no __unknown; If yes, clinical stage T:__ N:__M:__, stage group or __N/A    Impression: Thyroid nodule  Hyperthyroidism  Toxic multinodular goiter    Plan: Total thyroidectomy      ADITI Jean-Baptiste   06/20/24   6:48 AM EDT

## 2024-06-20 NOTE — ANESTHESIA PREPROCEDURE EVALUATION
Anesthesia Evaluation     Patient summary reviewed and Nursing notes reviewed   no history of anesthetic complications:   NPO Solid Status: > 8 hours  NPO Liquid Status: > 8 hours           Airway   Mallampati: II  TM distance: >3 FB  Neck ROM: full  Anterior, Large neck circumference and Possible difficult intubation  Dental - normal exam     Pulmonary - negative pulmonary ROS and normal exam    breath sounds clear to auscultation  Cardiovascular - negative cardio ROS and normal exam  Exercise tolerance: good (4-7 METS)    Rhythm: regular  Rate: normal        Neuro/Psych  (+) headaches (Migraines)  GI/Hepatic/Renal/Endo    (+) morbid obesity, thyroid problem hyperthyroidism and thyroid nodules    Musculoskeletal (-) negative ROS    Abdominal    Substance History - negative use     OB/GYN          Other - negative ROS                     Anesthesia Plan    ASA 3     general     intravenous induction     Anesthetic plan, risks, benefits, and alternatives have been provided, discussed and informed consent has been obtained with: patient.    Plan discussed with CRNA.    CODE STATUS:

## 2024-06-20 NOTE — DISCHARGE SUMMARY
Patient Name:  Fatuma Hurtado  YOB: 1990  [id number]      Date of Discharge:  6/20/2024    Discharge Diagnosis:   Toxic thyroid nodule    Problem List:  Active Hospital Problems    Diagnosis  POA    **Toxic thyroid nodule [E05.10]  Yes      Resolved Hospital Problems   No resolved problems to display.       Presenting Problem/History of Present Illness  Toxic thyroid nodule [E05.10]      Hospital Course  Patient is a 34 y.o. female presented with a toxic thyroid nodule. On 6/20/2024 I performed a total thyroidectomy. Later on POD#0, pain was well controlled. No numbness/tingling in fingers/toes/lips. No nausea/vomiting. No fevers/chills. Voiding spontaneously. Ambulating appropriately. The patient was thus found to be safe for discharge to home.           Procedures Performed    Procedure(s):  TOTAL THYROIDECTOMY  -------------------       Consults:   Consults       No orders found from 5/22/2024 to 6/21/2024.            Pertinent Test Results: N/A    Condition on Discharge:  Pain controlled with oral pain medication, tolerating diet, ambulating appropriately      Vital Signs  Temp:  [97.5 °F (36.4 °C)-98.6 °F (37 °C)] 97.8 °F (36.6 °C)  Heart Rate:  [] 90  Resp:  [18-27] 18  BP: (118-154)/(70-90) 118/70    Discharge Disposition  Home or Self Care    Discharge Medications     Discharge Medications        New Medications        Instructions Start Date   calcitriol 0.5 MCG capsule  Commonly known as: ROCALTROL   0.5 mcg, Oral, Every 12 Hours Scheduled      calcium carbonate 500 MG chewable tablet  Commonly known as: TUMS   2 tablets, Oral, 2 Times Daily      docusate sodium 100 MG capsule   100 mg, Oral, 2 Times Daily PRN      levothyroxine 175 MCG tablet  Commonly known as: SYNTHROID, LEVOTHROID   175 mcg, Oral, Every Early Morning   Start Date: June 21, 2024     oxyCODONE-acetaminophen 5-325 MG per tablet  Commonly known as: PERCOCET   1 tablet, Oral, Every 4 Hours PRN             Continue  These Medications        Instructions Start Date   Emgality 120 MG/ML auto-injector pen  Generic drug: galcanezumab-gnlm   120 mg, Subcutaneous, Every 30 Days      fish oil 1000 MG capsule capsule   1,000 mg, Oral, Daily With Breakfast      levocetirizine 5 MG tablet  Commonly known as: XYZAL   5 mg, Oral, Every Evening      montelukast 10 MG tablet  Commonly known as: SINGULAIR   10 mg, Oral, Nightly      NON FORMULARY   1 dose, Oral, Daily, [facility] Detox + Debloat      Nurtec 75 MG dispersible tablet  Generic drug: Rimegepant Sulfate   75 mg, Oral, As Needed      ondansetron ODT 4 MG disintegrating tablet  Commonly known as: ZOFRAN-ODT   4 mg, Translingual, As Needed      propranolol LA 60 MG 24 hr capsule  Commonly known as: INDERAL LA   60 mg, Oral, Daily      tiZANidine 4 MG tablet  Commonly known as: ZANAFLEX   4 mg, Oral, As Needed      Vienva 0.1-20 MG-MCG per tablet  Generic drug: levonorgestrel-ethinyl estradiol   1 tablet, Oral, Daily               Discharge Diet       Activity at Discharge  Activity Instructions       Discharge Activity      1) No driving until no longer taking narcotics.   2) Return to school / work in 1 week.  3) May shower starting 6/21/2024. No tub baths. No swimming.   4) If develop numbness/tingling in fingers/toes/lips, take two additional TUMS. If symptoms do not improve in one hour, take two more and call Dr. Wesley's office.            Follow-up Appointments  Future Appointments   Date Time Provider Department Center   8/22/2024 11:30 AM Paige Berkowitz MD [facility]     Additional Instructions for the Follow-ups that You Need to Schedule       Discharge Follow-up with Specified Provider: Dr. Wesley; 2 Weeks   As directed      To: Dr. Wesley   Follow Up: 2 Weeks   Follow Up Details: Call [phone] to make an appointment        Notify Physician or Go To The ED For the Following Conditions   As directed      Fever >100.4, increased pain, rapid neck swelling, new  redness/drainage from incision, numbness/tingling in fingers/toes/lips not controlled by extra TUMS    Order Comments: Fever >100.4, increased pain, rapid neck swelling, new redness/drainage from incision, numbness/tingling in fingers/toes/lips not controlled by extra TUMS                 Test Results Pending at Discharge  Pending Labs       Order Current Status    Tissue Pathology Exam In process            Time: Discharge 15 min    Nelson Wesley MD   06/20/24   15:31 EDT

## 2024-06-20 NOTE — ANESTHESIA POSTPROCEDURE EVALUATION
Patient: Fatuma Hurtado    Procedure Summary       Date: 06/20/24 Room / Location:  [facility] 05 / [facility]    Anesthesia Start: 0726 Anesthesia Stop: 0917    Procedure: TOTAL THYROIDECTOMY (Neck) Diagnosis:     Surgeons: Nelson Wesley MD Provider: Devon Ortega MD    Anesthesia Type: general ASA Status: 3            Anesthesia Type: general    Vitals  Vitals Value Taken Time   /79 06/20/24 0913   Temp 98.5 °F (36.9 °C) 06/20/24 0913   Pulse 96 06/20/24 0917   Resp 27 06/20/24 0913   SpO2 94 % 06/20/24 0917   Vitals shown include unfiled device data.        Post Anesthesia Care and Evaluation    Patient location during evaluation: PACU  Patient participation: complete - patient participated  Level of consciousness: sleepy but conscious  Pain management: adequate    Airway patency: patent  Anesthetic complications: No anesthetic complications  PONV Status: none  Cardiovascular status: hemodynamically stable and acceptable  Respiratory status: nonlabored ventilation, acceptable and nasal cannula  Hydration status: acceptable    Comments: HR 92  /79  95% on 4L NC  RR 18  Temp 98.5

## 2024-06-20 NOTE — BRIEF OP NOTE
THYROIDECTOMY  Progress Note    Fatuma Hurtado  6/20/2024    Pre-op Diagnosis:   Toxic thyroid nodule        Post-Op Diagnosis Codes:     * Toxic thyroid nodule     Procedure/CPT® Codes:        Procedure(s):  TOTAL THYROIDECTOMY              Surgeon(s):  Nelson Wesley MD    Anesthesia: General    Staff:   Circulator: Mago Brumfield RN  Scrub Person: Heaven Ortega  Nursing Assistant: China Garcia PCT  Assistant: Ryan Tellez PA  Assistant: Ryan Tellez PA      Estimated Blood Loss: minimal    Urine Voided: * No values recorded between 6/20/2024  7:26 AM and 6/20/2024  9:11 AM *    Specimens:                Specimens       ID Source Type Tests Collected By Collected At Frozen?    A Thyroid Tissue TISSUE PATHOLOGY EXAM   Nelson Wesley MD 6/20/24 0820 No    Description: LEFT THYROID LOBE    B Thyroid Tissue TISSUE PATHOLOGY EXAM   Nelson Wesley MD 6/20/24 0836 No    Description: RIGHT THYROID LOBE                  Drains: * No LDAs found *    Findings: Large left thyroid nodule removed completely grossly        Complications: None    Assistant: Ryan Tellez PA  was responsible for performing the following activities: Retraction, Suction, Suturing, Closing, and Placing Dressing and their skilled assistance was necessary for the success of this case.    Nelson Wesley MD     Date: 6/20/2024  Time: 09:20 EDT

## 2024-06-20 NOTE — OP NOTE
General Surgery Operative Note    THYROIDECTOMY  Procedure Report    Patient Name:  Fatuma Hurtado  YOB: 1990  [id number]     Date of Surgery:  6/20/2024       Pre-op Diagnosis: Toxic thyroid nodule    Post-op Diagnosis: Toxic thyroid nodule    Procedure(s):  TOTAL THYROIDECTOMY    Surgeon: Nelson Wesley MD    Assistant: Ryan Tellez PA     Anesthesia: General         Estimated Blood Loss: minimal    Complications: None     Implants:    Implant Name Type Inv. Item Serial No.  Lot No. LRB No. Used Action   CLIP LIGAT VASC HORIZON TI MD JESUS MANUEL 6CT - [id number] Implant CLIP LIGAT VASC HORIZON TI MD JESUS MANUEL 6CT  [employer]  N/A 1 Implanted   CLIP LIGAT VASC HORIZON TI SM YEL 6CT - [id number] Implant CLIP LIGAT VASC HORIZON TI SM YEL 6CT  [employer]  N/A 1 Implanted   HEMOST ABS SURGICEL ORIG 4X8IN STRL - [id number] Implant HEMOST ABS SURGICEL ORIG 4X8IN STRL  [employer]  DIV [employer] J [employer] J  N/A 1 Implanted       Specimen:          Specimens       ID Source Type Tests Collected By Collected At Frozen?    A Thyroid Tissue TISSUE PATHOLOGY EXAM   Nelson Wesley MD 6/20/24 0820 No    Description: LEFT THYROID LOBE    B Thyroid Tissue TISSUE PATHOLOGY EXAM   Nelson Wesley MD 6/20/24 0836 No    Description: RIGHT THYROID LOBE                Findings: Large left thyroid nodule removed completely grossly    Indications: The patient is a 34-year-old female with a history of hyperthyroidism and a large left thyroid nodule.  We discussed the risk, benefits, and alternatives to total thyroidectomy and the patient was agreeable.  Informed consent was obtained.    Description of Procedure:     After obtaining informed consent, the patient was taken to the operating room and placed in supine position. After appropriate DVT and antibiotic prophylaxis, general anesthesia was induced with placement of a NIM tube for nerve monitoring. The neck was prepped and draped in standard  sterile fashion.    An approximately 6 cm incision was made 2 fingerbreadths superior to the suprasternal notch transversely across the midline neck.  The skin was incised using a 15 blade.  The dermis and subcutaneous tissue were divided using Bovie electrocautery.  The platysma was dissected out and divided using Bovie electrocautery.  Subplatysmal flaps were created superiorly to the thyroid cartilage, inferiorly to the sternal notch, and laterally to the sternocleidomastoid muscles.  The strap muscles were divided in the midline at the median raphae.  Attention was paid to the left neck.  The strap muscles were dissected off the anterior and lateral surfaces of the thyroid lobe using Bovie electrocautery.  Babcocks were placed on the thyroid lobe and it was retracted anteriorly and medially.  Superior thyroid vessels were ligated with combination of clips and Harmonic.  The superior and inferior parathyroid glands were identified and dissected away from the thyroid lobe with blood supply left intact.  The recurrent laryngeal nerve was identified by both nerve stimulation and visualization.  Soft tissues around this area were dissected using bipolar electrocautery and small vessels ligated with clips.  Once the thyroid was dissected away from the area of the nerve the remainder of the thyroid lobe was dissected off the anterior surface of the trachea using bipolar cautery.  Inferior thyroid vessels were ligated with combination of clips and Harmonic. The thyroid was then transected at the level of the isthmus using the Harmonic device. The specimen was then removed from the body and inspected for parathyroid tissue, and none was noted. It was then sent to pathology as a permanent specimen.    Attention was then paid to the right neck. The strap muscles were dissected off the anterior and lateral surfaces of the thyroid lobe using Bovie electrocautery.  Babcocks were placed on the thyroid lobe and it was retracted  anteriorly and medially.  Superior thyroid vessels were ligated with combination of clips and Harmonic.  The superior and inferior parathyroid glands were identified and dissected away from the thyroid lobe with blood supply left intact.  The recurrent laryngeal nerve was identified by both nerve stimulation and visualization.  Soft tissues around this area were dissected using bipolar electrocautery and small vessels ligated with clips.  Once the thyroid was dissected away from the area of the nerve the remainder of the thyroid lobe was dissected off the anterior surface of the trachea using bipolar cautery.  Inferior thyroid vessels were ligated with combination of clips and Harmonic. The specimen was then removed from the body and inspected for parathyroid tissue, and none was noted. It was then sent to pathology as a permanent specimen.    Valsalva maneuver was performed by anesthesia to assess for any further bleeding and any further bleeding was controlled using placement of clips.  After hemostasis was obtained the left and right recurrent laryngeal nerves were grossly intact throughout the entire visualized course and stimulated appropriately with nerve stimulation.  Surgicel was placed in all areas of dissection.  The strap muscles were loosely reapproximated using interrupted 3-0 Vicryl.  The platysma was reapproximated using interrupted 3-0 Vicryl.  The skin was reapproximated using a running subcuticular 4-0 Monocryl.  A dry dressing was placed on top of this.  The patient awoke from anesthesia without complications and was taken to the PACU in stable condition.      Assistant: Ryan Tellez PA  was responsible for performing the following activities: Retraction, Suction, Suturing, Closing, and Placing Dressing and their skilled assistance was necessary for the success of this case.    Nelson Wesley MD     Date: 6/20/2024  Time: 09:21 EDT

## 2024-06-20 NOTE — PLAN OF CARE
"  Problem: Adult Inpatient Plan of Care  Goal: Plan of Care Review  Outcome: Ongoing, Progressing  Flowsheets  Taken 6/20/2024 1410 by Rafael Banuelos, RN  Progress: improving  Outcome Evaluation: VSS. Up ad renae. Tolerating clear liquids. States \"solid food hurts throat at this time.\" Tylenol for pain thus far. UOP adq, 2x. No complaints. Pt is going to walk the halls a little bit and then RN will DC pt soon after that. Continue care.  Taken 6/20/2024 0655 by Alexia Maynard RN  Plan of Care Reviewed With: patient   Goal Outcome Evaluation:           Progress: improving  Outcome Evaluation: VSS. Up ad renae. Tolerating clear liquids. States \"solid food hurts throat at this time.\" Tylenol for pain thus far. UOP adq, 2x. No complaints. Pt is going to walk the halls a little bit and then RN will DC pt soon after that. Continue care.                               "

## 2024-06-21 LAB
CYTO UR: NORMAL
LAB AP CASE REPORT: NORMAL
LAB AP CLINICAL INFORMATION: NORMAL
PATH REPORT.FINAL DX SPEC: NORMAL
PATH REPORT.GROSS SPEC: NORMAL

## 2024-07-05 DIAGNOSIS — E89.0 POSTOPERATIVE HYPOTHYROIDISM: Primary | ICD-10-CM

## 2024-07-08 ENCOUNTER — LAB (OUTPATIENT)
Facility: HOSPITAL | Age: 34
End: 2024-07-08
Payer: COMMERCIAL

## 2024-07-08 DIAGNOSIS — E89.0 POSTOPERATIVE HYPOTHYROIDISM: ICD-10-CM

## 2024-07-08 LAB
ALBUMIN SERPL-MCNC: 3.9 G/DL (ref 3.5–5.2)
ALBUMIN/GLOB SERPL: 1.1 G/DL
ALP SERPL-CCNC: 44 U/L (ref 39–117)
ALT SERPL W P-5'-P-CCNC: 15 U/L (ref 1–33)
ANION GAP SERPL CALCULATED.3IONS-SCNC: 12 MMOL/L (ref 5–15)
AST SERPL-CCNC: 16 U/L (ref 1–32)
BILIRUB SERPL-MCNC: 0.2 MG/DL (ref 0–1.2)
BUN SERPL-MCNC: 17 MG/DL (ref 6–20)
BUN/CREAT SERPL: 28.8 (ref 7–25)
CALCIUM SPEC-SCNC: 9.8 MG/DL (ref 8.6–10.5)
CHLORIDE SERPL-SCNC: 104 MMOL/L (ref 98–107)
CO2 SERPL-SCNC: 25 MMOL/L (ref 22–29)
CREAT SERPL-MCNC: 0.59 MG/DL (ref 0.57–1)
EGFRCR SERPLBLD CKD-EPI 2021: 121.5 ML/MIN/1.73
GLOBULIN UR ELPH-MCNC: 3.4 GM/DL
GLUCOSE SERPL-MCNC: 89 MG/DL (ref 65–99)
POTASSIUM SERPL-SCNC: 4.5 MMOL/L (ref 3.5–5.2)
PROT SERPL-MCNC: 7.3 G/DL (ref 6–8.5)
PTH-INTACT SERPL-MCNC: 9.6 PG/ML (ref 15–65)
SODIUM SERPL-SCNC: 141 MMOL/L (ref 136–145)
T4 FREE SERPL-MCNC: 1.86 NG/DL (ref 0.92–1.68)
TSH SERPL DL<=0.05 MIU/L-ACNC: 0.02 UIU/ML (ref 0.27–4.2)

## 2024-07-08 PROCEDURE — 83970 ASSAY OF PARATHORMONE: CPT

## 2024-07-08 PROCEDURE — 84443 ASSAY THYROID STIM HORMONE: CPT

## 2024-07-08 PROCEDURE — 84439 ASSAY OF FREE THYROXINE: CPT

## 2024-07-08 PROCEDURE — 80053 COMPREHEN METABOLIC PANEL: CPT

## 2024-07-09 RX ORDER — CALCITRIOL 0.5 UG/1
0.5 CAPSULE, LIQUID FILLED ORAL DAILY
Status: SHIPPED
Start: 2024-07-09 | End: 2024-10-07

## 2024-07-09 RX ORDER — CALCIUM CARBONATE 500 MG/1
1 TABLET, CHEWABLE ORAL 2 TIMES DAILY
Status: SHIPPED
Start: 2024-07-09 | End: 2024-10-07

## 2024-07-09 RX ORDER — LEVOTHYROXINE SODIUM 0.15 MG/1
150 TABLET ORAL
Qty: 30 TABLET | Refills: 2 | Status: SHIPPED | OUTPATIENT
Start: 2024-07-09 | End: 2024-10-07

## 2024-07-18 ENCOUNTER — LAB (OUTPATIENT)
Dept: LAB | Facility: HOSPITAL | Age: 34
End: 2024-07-18
Payer: COMMERCIAL

## 2024-07-18 ENCOUNTER — TRANSCRIBE ORDERS (OUTPATIENT)
Dept: LAB | Facility: HOSPITAL | Age: 34
End: 2024-07-18
Payer: COMMERCIAL

## 2024-07-18 DIAGNOSIS — E05.20 TOXIC MULTINODULAR GOITER: ICD-10-CM

## 2024-07-18 DIAGNOSIS — E05.20 TOXIC MULTINODULAR GOITER: Primary | ICD-10-CM

## 2024-07-18 LAB — TSH SERPL DL<=0.05 MIU/L-ACNC: 0.04 UIU/ML (ref 0.27–4.2)

## 2024-07-18 PROCEDURE — 83970 ASSAY OF PARATHORMONE: CPT

## 2024-07-18 PROCEDURE — 84439 ASSAY OF FREE THYROXINE: CPT

## 2024-07-18 PROCEDURE — 84443 ASSAY THYROID STIM HORMONE: CPT

## 2024-07-18 PROCEDURE — 36415 COLL VENOUS BLD VENIPUNCTURE: CPT

## 2024-07-18 PROCEDURE — 82310 ASSAY OF CALCIUM: CPT

## 2024-07-19 LAB
CALCIUM SPEC-SCNC: 9.4 MG/DL (ref 8.6–10.5)
PTH-INTACT SERPL-MCNC: 19.4 PG/ML (ref 15–65)
T4 FREE SERPL-MCNC: 1.76 NG/DL (ref 0.92–1.68)

## 2024-08-22 ENCOUNTER — OFFICE VISIT (OUTPATIENT)
Dept: ENDOCRINOLOGY | Facility: CLINIC | Age: 34
End: 2024-08-22
Payer: COMMERCIAL

## 2024-08-22 VITALS
BODY MASS INDEX: 42.28 KG/M2 | WEIGHT: 238.6 LBS | HEART RATE: 71 BPM | SYSTOLIC BLOOD PRESSURE: 122 MMHG | HEIGHT: 63 IN | OXYGEN SATURATION: 98 % | DIASTOLIC BLOOD PRESSURE: 70 MMHG

## 2024-08-22 DIAGNOSIS — E89.0 POSTOPERATIVE HYPOTHYROIDISM: Primary | ICD-10-CM

## 2024-08-22 DIAGNOSIS — E89.2 POSTSURGICAL HYPOPARATHYROIDISM: ICD-10-CM

## 2024-08-22 DIAGNOSIS — R53.83 OTHER FATIGUE: ICD-10-CM

## 2024-08-22 DIAGNOSIS — L65.9 HAIR LOSS: ICD-10-CM

## 2024-08-22 LAB
25(OH)D3 SERPL-MCNC: 43 NG/ML (ref 30–100)
PTH-INTACT SERPL-MCNC: 15 PG/ML (ref 15–65)
T4 FREE SERPL-MCNC: 1.89 NG/DL (ref 0.92–1.68)
TSH SERPL DL<=0.05 MIU/L-ACNC: 0.1 UIU/ML (ref 0.27–4.2)

## 2024-08-22 PROCEDURE — 84443 ASSAY THYROID STIM HORMONE: CPT | Performed by: INTERNAL MEDICINE

## 2024-08-22 PROCEDURE — 82306 VITAMIN D 25 HYDROXY: CPT | Performed by: INTERNAL MEDICINE

## 2024-08-22 PROCEDURE — 80069 RENAL FUNCTION PANEL: CPT | Performed by: INTERNAL MEDICINE

## 2024-08-22 PROCEDURE — 83970 ASSAY OF PARATHORMONE: CPT | Performed by: INTERNAL MEDICINE

## 2024-08-22 PROCEDURE — 84439 ASSAY OF FREE THYROXINE: CPT | Performed by: INTERNAL MEDICINE

## 2024-08-22 RX ORDER — CITALOPRAM HYDROBROMIDE 10 MG/1
10 TABLET ORAL DAILY
COMMUNITY

## 2024-08-22 RX ORDER — LEVOTHYROXINE SODIUM 137 UG/1
137 TABLET ORAL DAILY
COMMUNITY
End: 2024-08-23

## 2024-08-22 NOTE — PROGRESS NOTES
"Chief Complaint   Patient presents with    Thyroid Problem        HPI   Fatuma Hurtado is a 34 y.o. female had concerns including Thyroid Problem.      Patient went total thyroidectomy on June 20, 2024, pathology was benign.  Patient initially took calcium supplementation and Rocaltrol but reports this was subsequently discontinued.  She does not have any symptoms of hypocalcemia postoperatively such as numbness, tingling.  Dose of thyroid hormone has been changed twice since surgery, most recently 6 weeks ago.  She is currently taking levothyroxine 137 mcg daily.  She takes this first thing in the morning on an empty stomach.  Patient reports that she feels worse than she did prior to surgery.  Her primary concerns are fatigue, hair loss.    The following portions of the patient's history were reviewed and updated as appropriate: allergies, current medications, and past social history.    Review of Systems   Constitutional:  Positive for fatigue.   Neurological:  Negative for numbness.      /70 (BP Location: Left arm, Patient Position: Sitting, Cuff Size: Adult)   Pulse 71   Ht 160 cm (63\")   Wt 108 kg (238 lb 9.6 oz)   SpO2 98%   BMI 42.27 kg/m²      Physical Exam      Constitutional:  well developed; well nourished  no acute distress   ENT/Thyroid: surgically absent   Eyes: Conjunctiva: clear   Respiratory:  breathing is unlabored  clear to auscultation bilaterally   Cardiovascular:  regular rate and rhythm   Chest:  Not performed.   Abdomen: Not performed.   : Not performed.   Musculoskeletal: Not performed   Skin: not performed.   Neuro: mental status, speech normal   Psych: mood and affect are within normal limits       Labs/Imaging   Latest Reference Range & Units 06/20/24 09:35 07/08/24 10:50 07/18/24 15:38   Sodium 136 - 145 mmol/L  141    Potassium 3.5 - 5.2 mmol/L  4.5    Chloride 98 - 107 mmol/L  104    CO2 22.0 - 29.0 mmol/L  25.0    Anion Gap 5.0 - 15.0 mmol/L  12.0    BUN 6 - 20 mg/dL  " 17    Creatinine 0.57 - 1.00 mg/dL  0.59    BUN/Creatinine Ratio 7.0 - 25.0   28.8 (H)    eGFR >60.0 mL/min/1.73  121.5    Glucose 65 - 99 mg/dL  89    Calcium 8.6 - 10.5 mg/dL 9.0 9.8 9.4   Alkaline Phosphatase 39 - 117 U/L  44    Total Protein 6.0 - 8.5 g/dL  7.3    Albumin 3.5 - 5.2 g/dL  3.9    Globulin gm/dL  3.4    A/G Ratio g/dL  1.1    AST (SGOT) 1 - 32 U/L  16    ALT (SGPT) 1 - 33 U/L  15    Total Bilirubin 0.0 - 1.2 mg/dL  0.2    PTH Intact (Serial Monitor) 15.0 - 65.0 pg/mL 12.2 (L) 9.6 (L) 19.4   TSH Baseline 0.270 - 4.200 uIU/mL  0.024 (L) 0.038 (L)   Free T4 0.92 - 1.68 ng/dL  1.86 (H) 1.76 (H)   (H): Data is abnormally high  (L): Data is abnormally low    Final Diagnosis   1.  LEFT THYROID LOBE, THYROIDECTOMY:  Lymphocytic (Hashimoto's) thyroiditis with adenomatoid nodules  Negative for carcinoma     2.  RIGHT THYROID LOBE, THYROIDECTOMY:  Lymphocytic (Hashimoto's) thyroiditis with adenomatoid nodules  Negative for carcinoma  Benign parathyroid tissue       Diagnoses and all orders for this visit:    1. Postoperative hypothyroidism (Primary)  Patient is status post total thyroidectomy in June 2024 for multinodular goiter, hyperthyroidism.  Final pathology was benign.  Patient is currently taking levothyroxine 137 mcg daily for hypothyroidism, she has been on this dose for approximately 6 weeks.  She reports fatigue, hair loss.  Plan to update labs today and adjust medication as clinically indicated.    2. Low serum parathyroid hormone (PTH)  Noted after surgery, discussed that hypoparathyroidism is generally transient.  Most recent PTH had improved.  Patient is no longer taking calcium or vitamin D supplementation.  Update labs today    3.  Other fatigue  4.  Hair loss  Patient is concerned that the symptoms have worsened since surgery.  Discussed with patient that the symptoms are nonspecific and could be multifactorial.  Furthermore, they are generally attributable to low thyroid hormone and recent  labs for patient have indicated iatrogenic hyperthyroidism requiring dose reduction.  We will work to normalize thyroid function but I discussed with patient there may be other contributing factors.       Return in about 4 months (around 12/22/2024) for Next scheduled follow up. The patient was instructed to contact the clinic with any interval questions or concerns.    Electronically signed by: Paige Berkowitz MD   Endocrinologist    Dictated Utilizing Dragon Dictation

## 2024-08-23 LAB
ALBUMIN SERPL-MCNC: 4.2 G/DL (ref 3.5–5.2)
ANION GAP SERPL CALCULATED.3IONS-SCNC: 17.7 MMOL/L (ref 5–15)
BUN SERPL-MCNC: 13 MG/DL (ref 6–20)
BUN/CREAT SERPL: 18.3 (ref 7–25)
CALCIUM SPEC-SCNC: 9.8 MG/DL (ref 8.6–10.5)
CHLORIDE SERPL-SCNC: 100 MMOL/L (ref 98–107)
CO2 SERPL-SCNC: 23.3 MMOL/L (ref 22–29)
CREAT SERPL-MCNC: 0.71 MG/DL (ref 0.57–1)
EGFRCR SERPLBLD CKD-EPI 2021: 114.6 ML/MIN/1.73
GLUCOSE SERPL-MCNC: 73 MG/DL (ref 65–99)
PHOSPHATE SERPL-MCNC: 2.3 MG/DL (ref 2.5–4.5)
POTASSIUM SERPL-SCNC: 4 MMOL/L (ref 3.5–5.2)
SODIUM SERPL-SCNC: 141 MMOL/L (ref 136–145)

## 2024-08-23 RX ORDER — LEVOTHYROXINE SODIUM 0.12 MG/1
125 TABLET ORAL
Qty: 30 TABLET | Refills: 2 | Status: SHIPPED | OUTPATIENT
Start: 2024-08-23 | End: 2024-11-21

## 2024-10-14 NOTE — TELEPHONE ENCOUNTER
Rx Refill Note  Requested Prescriptions     Pending Prescriptions Disp Refills    levothyroxine (SYNTHROID, LEVOTHROID) 150 MCG tablet [Pharmacy Med Name: LEVOTHYROXINE 0.150MG (150MCG) TAB] 30 tablet 0     Sig: TAKE 1 TABLET BY MOUTH EVERY MORNING      Last office visit with prescribing clinician: 8/22/2024    Next office visit with prescribing clinician: 1/9/2024       Lesly Perez MA  10/14/24, 08:32 EDT

## 2024-10-15 RX ORDER — LEVOTHYROXINE SODIUM 150 UG/1
150 TABLET ORAL EVERY MORNING
Qty: 30 TABLET | Refills: 0 | OUTPATIENT
Start: 2024-10-15

## 2024-10-15 NOTE — TELEPHONE ENCOUNTER
Please contact patient and verify what dose of thyroid hormone she is currently taking.  Prescription was pended for 150 mcg daily but patient's most recent prescription was reduced (125 mcg daily in August).  If she needs refills of the 125, please pend for signature.

## 2024-10-15 NOTE — TELEPHONE ENCOUNTER
Spoke to pt-she accidentally selected the wrong med to refill.  She is on 125mcg but does not need a refill

## 2024-10-31 DIAGNOSIS — E89.0 POSTOPERATIVE HYPOTHYROIDISM: Primary | ICD-10-CM

## 2024-11-07 ENCOUNTER — TELEPHONE (OUTPATIENT)
Dept: ENDOCRINOLOGY | Facility: CLINIC | Age: 34
End: 2024-11-07
Payer: COMMERCIAL

## 2024-11-08 DIAGNOSIS — E89.0 POSTOPERATIVE HYPOTHYROIDISM: ICD-10-CM

## 2024-11-21 RX ORDER — LEVOTHYROXINE SODIUM 125 MCG
125 TABLET ORAL DAILY
Qty: 30 TABLET | Refills: 5 | Status: SHIPPED | OUTPATIENT
Start: 2024-11-21 | End: 2025-11-21

## 2025-01-09 ENCOUNTER — OFFICE VISIT (OUTPATIENT)
Dept: ENDOCRINOLOGY | Facility: CLINIC | Age: 35
End: 2025-01-09
Payer: COMMERCIAL

## 2025-01-09 VITALS
SYSTOLIC BLOOD PRESSURE: 112 MMHG | HEIGHT: 63 IN | DIASTOLIC BLOOD PRESSURE: 80 MMHG | HEART RATE: 86 BPM | BODY MASS INDEX: 43.27 KG/M2 | OXYGEN SATURATION: 98 % | WEIGHT: 244.2 LBS

## 2025-01-09 DIAGNOSIS — E89.0 POSTOPERATIVE HYPOTHYROIDISM: Primary | ICD-10-CM

## 2025-01-09 LAB
T3 SERPL-MCNC: 91.1 NG/DL (ref 80–200)
T3FREE SERPL-MCNC: 2.23 PG/ML (ref 2–4.4)
T4 FREE SERPL-MCNC: 1.48 NG/DL (ref 0.92–1.68)
TSH SERPL DL<=0.05 MIU/L-ACNC: 1.28 UIU/ML (ref 0.27–4.2)

## 2025-01-09 PROCEDURE — 84481 FREE ASSAY (FT-3): CPT | Performed by: INTERNAL MEDICINE

## 2025-01-09 PROCEDURE — 99213 OFFICE O/P EST LOW 20 MIN: CPT | Performed by: INTERNAL MEDICINE

## 2025-01-09 RX ORDER — MELOXICAM 15 MG/1
1 TABLET ORAL DAILY
COMMUNITY
Start: 2024-10-09

## 2025-01-09 RX ORDER — UBROGEPANT 100 MG/1
100 TABLET ORAL AS NEEDED
COMMUNITY

## 2025-01-09 NOTE — PROGRESS NOTES
"Chief Complaint   Patient presents with    Hypothyroidism        HPI   Fatuma Hurtado is a 34 y.o. female had concerns including Hypothyroidism.       Patient was transitioned to brand-name Synthroid in the interim between visits per her request.  She continues on 125 mcg daily.  She has not noticed any improvement in symptomatic concerns since transitioning to branded medication.  She reports ongoing concern for hair loss, fatigue, weight gain.      The following portions of the patient's history were reviewed and updated as appropriate: allergies, current medications, and past social history.    Review of Systems   Constitutional:  Positive for fatigue and unexpected weight gain.      /80 (BP Location: Left arm, Patient Position: Sitting)   Pulse 86   Ht 160 cm (62.99\")   Wt 111 kg (244 lb 3.2 oz)   SpO2 98%   BMI 43.27 kg/m²      Physical Exam      Constitutional:  well developed; well nourished  no acute distress  appears stated age   ENT/Thyroid: surgically absent   Eyes: Conjunctiva: clear   Respiratory:  breathing is unlabored  clear to auscultation bilaterally   Cardiovascular:  regular rate and rhythm   Chest:  Not performed.   Abdomen: Not performed.   : Not performed.   Musculoskeletal: Not performed   Skin: not performed.   Neuro: mental status, speech normal   Psych: mood and affect are within normal limits       Labs/Imaging  Labs dated 11/5/2024  TSH 1.45  Free T4 1.39  Free T3 2.3    Diagnoses and all orders for this visit:    1. Postoperative hypothyroidism (Primary)  -     TSH; Future  -     T4, Free; Future  -     T3; Future     Patient is status post total thyroidectomy in June 2024 for multinodular goiter, hyperthyroidism. Final pathology was benign.  Patient is currently taking Synthroid 125 mcg daily.  Labs from November 2024 were at goal.  Discussed with patient that her symptomatic concerns are nonspecific and could have multiple potential causes.  Given ongoing concern for " fatigue, we did discuss potential trial of T3 containing therapy.  Patient denies any plans for pregnancy.  She denies any history of arrhythmia or coronary artery disease.  We did discuss potential adverse effects of T3 containing therapy.  Plan to update labs today, if feasible, we will plan to initiate liothyronine 5 mcg daily in addition to current thyroid hormone.  Given improvement in symptoms, patient desires transition back to generic formulation.    Return in about 4 months (around 5/9/2025). The patient was instructed to contact the clinic with any interval questions or concerns.    Electronically signed by: Paige Berkowitz MD   Endocrinologist    Dictated Utilizing Dragon Dictation

## 2025-01-10 RX ORDER — LEVOTHYROXINE SODIUM 125 UG/1
125 TABLET ORAL DAILY
Qty: 30 TABLET | Refills: 5 | Status: SHIPPED | OUTPATIENT
Start: 2025-01-10 | End: 2026-01-10

## 2025-01-10 RX ORDER — LIOTHYRONINE SODIUM 5 UG/1
5 TABLET ORAL DAILY
Qty: 30 TABLET | Refills: 5 | Status: SHIPPED | OUTPATIENT
Start: 2025-01-10 | End: 2026-01-10

## 2025-05-15 ENCOUNTER — OFFICE VISIT (OUTPATIENT)
Dept: ENDOCRINOLOGY | Facility: CLINIC | Age: 35
End: 2025-05-15
Payer: COMMERCIAL

## 2025-05-15 VITALS
HEIGHT: 63 IN | DIASTOLIC BLOOD PRESSURE: 76 MMHG | BODY MASS INDEX: 43.94 KG/M2 | OXYGEN SATURATION: 98 % | HEART RATE: 68 BPM | SYSTOLIC BLOOD PRESSURE: 118 MMHG | WEIGHT: 248 LBS

## 2025-05-15 DIAGNOSIS — Z83.3 FAMILY HISTORY OF DIABETES MELLITUS: ICD-10-CM

## 2025-05-15 DIAGNOSIS — R53.83 OTHER FATIGUE: ICD-10-CM

## 2025-05-15 DIAGNOSIS — E89.0 POSTOPERATIVE HYPOTHYROIDISM: Primary | ICD-10-CM

## 2025-05-15 DIAGNOSIS — R63.8 UNABLE TO LOSE WEIGHT: ICD-10-CM

## 2025-05-15 LAB — HBA1C MFR BLD: 5.7 % (ref 4.8–5.6)

## 2025-05-15 PROCEDURE — 84439 ASSAY OF FREE THYROXINE: CPT | Performed by: INTERNAL MEDICINE

## 2025-05-15 PROCEDURE — 83036 HEMOGLOBIN GLYCOSYLATED A1C: CPT | Performed by: INTERNAL MEDICINE

## 2025-05-15 PROCEDURE — 84481 FREE ASSAY (FT-3): CPT | Performed by: INTERNAL MEDICINE

## 2025-05-15 PROCEDURE — 84443 ASSAY THYROID STIM HORMONE: CPT | Performed by: INTERNAL MEDICINE

## 2025-05-15 RX ORDER — DEXAMETHASONE 1 MG
1 TABLET ORAL ONCE
Qty: 1 TABLET | Refills: 0 | Status: SHIPPED | OUTPATIENT
Start: 2025-05-15 | End: 2025-05-15

## 2025-05-15 RX ORDER — FREMANEZUMAB-VFRM 225 MG/1.5ML
INJECTION SUBCUTANEOUS
COMMUNITY
Start: 2025-03-31

## 2025-05-15 NOTE — PROGRESS NOTES
"Chief Complaint   Patient presents with    Hypothyroidism        HPI   Fatuma Hurtado is a 35 y.o. female had concerns including Hypothyroidism.      Patient presents for evaluation of postsurgical hypothyroidism.    Patient is currently taking levothyroxine 125 mcg daily, liothyronine 5 mcg daily.  She denies any adverse effects from starting liothyronine and reports that she transiently noted an improvement in symptoms which has unfortunately now waned.  She reports ongoing concern for fatigue, weight gain.  She screen for other endocrine causes of the symptoms.  She does report recent recurrent issues with ear infections, strep throat.  Due to this, she is planning to go back and see her ENT.  She does report that she has been treated with steroids due to this, most recently completed a course yesterday.    The following portions of the patient's history were reviewed and updated as appropriate: allergies, current medications, and past social history.    Review of Systems   Constitutional:  Positive for fatigue and unexpected weight gain.      /76   Pulse 68   Ht 160 cm (62.99\")   Wt 112 kg (248 lb)   SpO2 98%   BMI 43.94 kg/m²      Physical Exam      Constitutional:  well developed; well nourished  no acute distress  appears stated age  obese - Body mass index is 43.94 kg/m².   ENT/Thyroid: surgically absent   Eyes: Conjunctiva: clear   Respiratory:  breathing is unlabored  clear to auscultation bilaterally   Cardiovascular:  regular rate and rhythm   Chest:  Not performed.   Abdomen: Not performed.   : Not performed.   Musculoskeletal: Not performed   Skin: not performed.   Neuro: mental status, speech normal   Psych: mood and affect are within normal limits       Labs/Imaging   Latest Reference Range & Units 01/09/25 11:55   TSH Baseline 0.270 - 4.200 uIU/mL 1.280   Free T4 0.92 - 1.68 ng/dL 1.48   T3, Total 80.0 - 200.0 ng/dl 91.1   T3, Free 2.00 - 4.40 pg/mL 2.23       Diagnoses and all " orders for this visit:    1. Postoperative hypothyroidism (Primary)  -     TSH; Future  -     T4, Free; Future  -     T3, Free; Future  Patient currently taking levothyroxine 125 mcg, liothyronine 5 mcg daily.  Liothyronine was started last visit due to ongoing concerns for fatigue, weight gain.  She reported only transient improvement, no adverse effect.  Plan to update labs today and adjust medication as clinically indicated.  We did discuss that her symptomatic concerns are multifactorial and I would also recommend evaluation for other potential contributing factors.  Patient did previously complete a trial of transition to brand-name Synthroid, and this did not have any impact on symptoms.    2. Unable to lose weight  -     Cortisol - AM; Future  -     Dexamethasone Level, Serum; Future  Endocrine causes of weight gain/inability to lose weight were reviewed.  Patient would like to screen for hypercortisolism.  We discussed this should not be completed until at least 6 to 8 weeks from most recent steroid exposure.  Patient was given orders for completion of dexamethasone suppression test during visit today.    3. Family history of diabetes mellitus  -     Hemoglobin A1c; Future  4. Other fatigue  -     Hemoglobin A1c; Future  Discussed that steroid exposure can raise blood glucose.  Patient does have a family history of diabetes.  Plan to check hemoglobin A1c.    Other orders  -     dexAMETHasone (DECADRON) 1 MG tablet; Take 1 tablet by mouth 1 (One) Time for 1 dose. Take at 11 PM night before lab draw at 8 AM  Dispense: 1 tablet; Refill: 0         Return in about 4 months (around 9/15/2025). The patient was instructed to contact the clinic with any interval questions or concerns.    Electronically signed by: Paige Berkowitz MD   Endocrinologist    Dictated Utilizing Dragon Dictation

## 2025-05-16 LAB
T3FREE SERPL-MCNC: 3.11 PG/ML (ref 2–4.4)
T4 FREE SERPL-MCNC: 1.61 NG/DL (ref 0.92–1.68)
TSH SERPL DL<=0.05 MIU/L-ACNC: 1.07 UIU/ML (ref 0.27–4.2)

## 2025-07-01 DIAGNOSIS — R63.8 UNABLE TO LOSE WEIGHT: ICD-10-CM

## 2025-07-16 DIAGNOSIS — E89.0 POSTOPERATIVE HYPOTHYROIDISM: Primary | ICD-10-CM

## 2025-07-18 ENCOUNTER — LAB (OUTPATIENT)
Facility: HOSPITAL | Age: 35
End: 2025-07-18
Payer: COMMERCIAL

## 2025-07-18 PROCEDURE — 84439 ASSAY OF FREE THYROXINE: CPT | Performed by: INTERNAL MEDICINE

## 2025-07-18 PROCEDURE — 84443 ASSAY THYROID STIM HORMONE: CPT | Performed by: INTERNAL MEDICINE

## 2025-07-19 LAB
T4 FREE SERPL-MCNC: 1.34 NG/DL (ref 0.92–1.68)
TSH SERPL DL<=0.05 MIU/L-ACNC: 1 UIU/ML (ref 0.27–4.2)

## 2025-07-21 RX ORDER — LEVOTHYROXINE SODIUM 125 UG/1
125 TABLET ORAL DAILY
Qty: 30 TABLET | OUTPATIENT
Start: 2025-07-21

## 2025-07-21 NOTE — TELEPHONE ENCOUNTER
Rx Refill Note  Requested Prescriptions     Pending Prescriptions Disp Refills    levothyroxine (SYNTHROID, LEVOTHROID) 125 MCG tablet [Pharmacy Med Name: LEVOTHYROXINE 0.125MG (125MCG) TAB] 30 tablet      Sig: TAKE 1 TABLET BY MOUTH DAILY      Last office visit with prescribing clinician: 5/15/2025      Next office visit with prescribing clinician: 9/24/2025     Denied. Refill available at [facility]    {  Ting Pino MA  07/21/25, 09:04 EDT

## 2025-07-22 RX ORDER — LEVOTHYROXINE SODIUM 125 UG/1
125 TABLET ORAL DAILY
Qty: 90 TABLET | Refills: 1 | Status: SHIPPED | OUTPATIENT
Start: 2025-07-22 | End: 2026-07-22

## (undated) DEVICE — INTENDED USE FOR SURGICAL MARKING ON INTACT SKIN, ALSO PROVIDES A PERMANENT METHOD OF IDENTIFYING OBJECTS IN THE OPERATING ROOM: Brand: WRITESITE® REGULAR TIP SKIN MARKER

## (undated) DEVICE — TOWEL,OR,DSP,ST,BLUE,STD,4/PK,20PK/CS: Brand: MEDLINE

## (undated) DEVICE — HARMONIC FOCUS SHEARS 9CM LENGTH + ADAPTIVE TISSUE TECHNOLOGY FOR USE WITH BLUE HAND PIECE ONLY: Brand: HARMONIC FOCUS

## (undated) DEVICE — MARKER,SKIN,W/RULER,DUAL,STOP: Brand: MEDLINE

## (undated) DEVICE — GLV SURG BIOGEL LTX PF 7

## (undated) DEVICE — PK MINOR SPLT 10

## (undated) DEVICE — APPL CHLORAPREP 26ML CLR

## (undated) DEVICE — TRAP FLD MINIVAC MEGADYNE 100ML

## (undated) DEVICE — SUT SILK 2/0 TIES 18IN A185H

## (undated) DEVICE — DISPOSABLE BIPOLAR FORCEPS 4" (10.2CM) JEWELERS, STRAIGHT 0.4MM TIP AND 12 FT. (3.6M) CABLE: Brand: KIRWAN

## (undated) DEVICE — BLANKT WARM LOWR/BDY 100X120CM

## (undated) DEVICE — SUT MNCRYL PLS ANTIB UD 4/0 PS2 18IN

## (undated) DEVICE — ELECTRD BLD EZ CLN MOD XLNG 2.75IN

## (undated) DEVICE — PATIENT RETURN ELECTRODE, SINGLE-USE, CONTACT QUALITY MONITORING, ADULT, WITH 9FT CORD, FOR PATIENTS WEIGING OVER 33LBS. (15KG): Brand: MEGADYNE

## (undated) DEVICE — DISH PETRI 3.5IN MD STRL LF

## (undated) DEVICE — DRAPE,INSTRUMENT,MAGNETIC,10X16: Brand: MEDLINE

## (undated) DEVICE — HDRST PAD EA/20PC

## (undated) DEVICE — PENCL SMOKE/EVAC MEGADYNE TELESCP 10FT

## (undated) DEVICE — GAUZE,SPONGE,4"X4",16PLY,XRAY,STRL,LF: Brand: MEDLINE

## (undated) DEVICE — PROBE 8225825X 3PK INCREMT STD PRASS TIP: Brand: NIM

## (undated) DEVICE — UNDERGLV SURG BIOGEL INDICATOR LF PF 7.5

## (undated) DEVICE — SUT SILK 3/0 TIES 18IN A184H

## (undated) DEVICE — ANTIBACTERIAL UNDYED BRAIDED (POLYGLACTIN 910), SYNTHETIC ABSORBABLE SUTURE: Brand: COATED VICRYL

## (undated) DEVICE — DRSNG SURESITE WNDW 4X4.5

## (undated) DEVICE — PCH INST SURG INVISISHIELD 2PCKT